# Patient Record
Sex: FEMALE | Race: WHITE | URBAN - METROPOLITAN AREA
[De-identification: names, ages, dates, MRNs, and addresses within clinical notes are randomized per-mention and may not be internally consistent; named-entity substitution may affect disease eponyms.]

---

## 2018-03-12 ENCOUNTER — INPATIENT (INPATIENT)
Facility: HOSPITAL | Age: 33
LOS: 1 days | Discharge: HOME | End: 2018-03-14
Attending: SURGERY

## 2018-03-12 VITALS
OXYGEN SATURATION: 99 % | SYSTOLIC BLOOD PRESSURE: 119 MMHG | HEART RATE: 68 BPM | TEMPERATURE: 96 F | RESPIRATION RATE: 18 BRPM | DIASTOLIC BLOOD PRESSURE: 85 MMHG

## 2018-03-12 DIAGNOSIS — Z98.891 HISTORY OF UTERINE SCAR FROM PREVIOUS SURGERY: Chronic | ICD-10-CM

## 2018-03-12 LAB
ALBUMIN SERPL ELPH-MCNC: 3.5 G/DL — SIGNIFICANT CHANGE UP (ref 3–5.5)
ALP SERPL-CCNC: 46 U/L — SIGNIFICANT CHANGE UP (ref 30–115)
ALT FLD-CCNC: 16 U/L — SIGNIFICANT CHANGE UP (ref 0–41)
ANION GAP SERPL CALC-SCNC: 4 MMOL/L — LOW (ref 7–14)
APPEARANCE UR: CLEAR — SIGNIFICANT CHANGE UP
AST SERPL-CCNC: 14 U/L — SIGNIFICANT CHANGE UP (ref 0–41)
BASOPHILS # BLD AUTO: 0.03 K/UL — SIGNIFICANT CHANGE UP (ref 0–0.2)
BASOPHILS NFR BLD AUTO: 0.3 % — SIGNIFICANT CHANGE UP (ref 0–1)
BILIRUB SERPL-MCNC: 0.3 MG/DL — SIGNIFICANT CHANGE UP (ref 0.2–1.2)
BILIRUB UR-MCNC: NEGATIVE — SIGNIFICANT CHANGE UP
BUN SERPL-MCNC: 10 MG/DL — SIGNIFICANT CHANGE UP (ref 10–20)
CALCIUM SERPL-MCNC: 9 MG/DL — SIGNIFICANT CHANGE UP (ref 8.5–10.1)
CHLORIDE SERPL-SCNC: 106 MMOL/L — SIGNIFICANT CHANGE UP (ref 98–110)
CO2 SERPL-SCNC: 26 MMOL/L — SIGNIFICANT CHANGE UP (ref 17–32)
COLOR SPEC: YELLOW — SIGNIFICANT CHANGE UP
CREAT SERPL-MCNC: 0.7 MG/DL — SIGNIFICANT CHANGE UP (ref 0.7–1.5)
DIFF PNL FLD: NEGATIVE — SIGNIFICANT CHANGE UP
EOSINOPHIL # BLD AUTO: 0.23 K/UL — SIGNIFICANT CHANGE UP (ref 0–0.7)
EOSINOPHIL NFR BLD AUTO: 2.1 % — SIGNIFICANT CHANGE UP (ref 0–8)
GLUCOSE SERPL-MCNC: 107 MG/DL — SIGNIFICANT CHANGE UP (ref 70–110)
GLUCOSE UR QL: NEGATIVE MG/DL — SIGNIFICANT CHANGE UP
HCT VFR BLD CALC: 36.6 % — LOW (ref 37–47)
HGB BLD-MCNC: 12.2 G/DL — SIGNIFICANT CHANGE UP (ref 12–16)
IMM GRANULOCYTES NFR BLD AUTO: 0.4 % — HIGH (ref 0.1–0.3)
KETONES UR-MCNC: NEGATIVE — SIGNIFICANT CHANGE UP
LACTATE SERPL-SCNC: 1.1 MMOL/L — SIGNIFICANT CHANGE UP (ref 0.5–2.2)
LEUKOCYTE ESTERASE UR-ACNC: NEGATIVE — SIGNIFICANT CHANGE UP
LIDOCAIN IGE QN: 29 U/L — SIGNIFICANT CHANGE UP (ref 7–60)
LYMPHOCYTES # BLD AUTO: 3.61 K/UL — HIGH (ref 1.2–3.4)
LYMPHOCYTES # BLD AUTO: 33.1 % — SIGNIFICANT CHANGE UP (ref 20.5–51.1)
MCHC RBC-ENTMCNC: 27.5 PG — SIGNIFICANT CHANGE UP (ref 27–31)
MCHC RBC-ENTMCNC: 33.3 G/DL — SIGNIFICANT CHANGE UP (ref 32–37)
MCV RBC AUTO: 82.4 FL — SIGNIFICANT CHANGE UP (ref 81–99)
MONOCYTES # BLD AUTO: 0.68 K/UL — HIGH (ref 0.1–0.6)
MONOCYTES NFR BLD AUTO: 6.2 % — SIGNIFICANT CHANGE UP (ref 1.7–9.3)
NEUTROPHILS # BLD AUTO: 6.31 K/UL — SIGNIFICANT CHANGE UP (ref 1.4–6.5)
NEUTROPHILS NFR BLD AUTO: 57.9 % — SIGNIFICANT CHANGE UP (ref 42.2–75.2)
NITRITE UR-MCNC: NEGATIVE — SIGNIFICANT CHANGE UP
PH UR: 8 — SIGNIFICANT CHANGE UP (ref 5–8)
PLATELET # BLD AUTO: 265 K/UL — SIGNIFICANT CHANGE UP (ref 130–400)
POTASSIUM SERPL-MCNC: 3.5 MMOL/L — SIGNIFICANT CHANGE UP (ref 3.5–5)
POTASSIUM SERPL-SCNC: 3.5 MMOL/L — SIGNIFICANT CHANGE UP (ref 3.5–5)
PROT SERPL-MCNC: 6.5 G/DL — SIGNIFICANT CHANGE UP (ref 6–8)
PROT UR-MCNC: (no result) MG/DL
RBC # BLD: 4.44 M/UL — SIGNIFICANT CHANGE UP (ref 4.2–5.4)
RBC # FLD: 12.5 % — SIGNIFICANT CHANGE UP (ref 11.5–14.5)
SODIUM SERPL-SCNC: 136 MMOL/L — SIGNIFICANT CHANGE UP (ref 135–146)
SP GR SPEC: 1.02 — SIGNIFICANT CHANGE UP (ref 1.01–1.03)
UROBILINOGEN FLD QL: 1 MG/DL (ref 0.2–0.2)
WBC # BLD: 10.9 K/UL — HIGH (ref 4.8–10.8)
WBC # FLD AUTO: 10.9 K/UL — HIGH (ref 4.8–10.8)

## 2018-03-12 RX ORDER — HEPARIN SODIUM 5000 [USP'U]/ML
5000 INJECTION INTRAVENOUS; SUBCUTANEOUS EVERY 8 HOURS
Qty: 0 | Refills: 0 | Status: DISCONTINUED | OUTPATIENT
Start: 2018-03-12 | End: 2018-03-13

## 2018-03-12 RX ORDER — IBUPROFEN 200 MG
400 TABLET ORAL EVERY 6 HOURS
Qty: 0 | Refills: 0 | Status: DISCONTINUED | OUTPATIENT
Start: 2018-03-12 | End: 2018-03-13

## 2018-03-12 RX ORDER — CIPROFLOXACIN LACTATE 400MG/40ML
400 VIAL (ML) INTRAVENOUS ONCE
Qty: 0 | Refills: 0 | Status: DISCONTINUED | OUTPATIENT
Start: 2018-03-12 | End: 2018-03-12

## 2018-03-12 RX ORDER — LANOLIN ALCOHOL/MO/W.PET/CERES
5 CREAM (GRAM) TOPICAL ONCE
Qty: 0 | Refills: 0 | Status: COMPLETED | OUTPATIENT
Start: 2018-03-12 | End: 2018-03-12

## 2018-03-12 RX ORDER — AMPICILLIN SODIUM AND SULBACTAM SODIUM 250; 125 MG/ML; MG/ML
3 INJECTION, POWDER, FOR SUSPENSION INTRAMUSCULAR; INTRAVENOUS ONCE
Qty: 0 | Refills: 0 | Status: COMPLETED | OUTPATIENT
Start: 2018-03-12 | End: 2018-03-12

## 2018-03-12 RX ORDER — METRONIDAZOLE 500 MG
500 TABLET ORAL ONCE
Qty: 0 | Refills: 0 | Status: DISCONTINUED | OUTPATIENT
Start: 2018-03-12 | End: 2018-03-12

## 2018-03-12 RX ORDER — MORPHINE SULFATE 50 MG/1
2 CAPSULE, EXTENDED RELEASE ORAL ONCE
Qty: 0 | Refills: 0 | Status: DISCONTINUED | OUTPATIENT
Start: 2018-03-12 | End: 2018-03-12

## 2018-03-12 RX ORDER — AMPICILLIN SODIUM AND SULBACTAM SODIUM 250; 125 MG/ML; MG/ML
INJECTION, POWDER, FOR SUSPENSION INTRAMUSCULAR; INTRAVENOUS
Qty: 0 | Refills: 0 | Status: DISCONTINUED | OUTPATIENT
Start: 2018-03-12 | End: 2018-03-13

## 2018-03-12 RX ORDER — ACETAMINOPHEN 500 MG
650 TABLET ORAL EVERY 6 HOURS
Qty: 0 | Refills: 0 | Status: DISCONTINUED | OUTPATIENT
Start: 2018-03-12 | End: 2018-03-13

## 2018-03-12 RX ORDER — PANTOPRAZOLE SODIUM 20 MG/1
40 TABLET, DELAYED RELEASE ORAL DAILY
Qty: 0 | Refills: 0 | Status: DISCONTINUED | OUTPATIENT
Start: 2018-03-12 | End: 2018-03-13

## 2018-03-12 RX ORDER — MORPHINE SULFATE 50 MG/1
6 CAPSULE, EXTENDED RELEASE ORAL ONCE
Qty: 0 | Refills: 0 | Status: DISCONTINUED | OUTPATIENT
Start: 2018-03-12 | End: 2018-03-12

## 2018-03-12 RX ORDER — SODIUM CHLORIDE 9 MG/ML
1000 INJECTION, SOLUTION INTRAVENOUS
Qty: 0 | Refills: 0 | Status: DISCONTINUED | OUTPATIENT
Start: 2018-03-13 | End: 2018-03-13

## 2018-03-12 RX ORDER — ONDANSETRON 8 MG/1
4 TABLET, FILM COATED ORAL EVERY 6 HOURS
Qty: 0 | Refills: 0 | Status: DISCONTINUED | OUTPATIENT
Start: 2018-03-12 | End: 2018-03-13

## 2018-03-12 RX ORDER — ONDANSETRON 8 MG/1
4 TABLET, FILM COATED ORAL ONCE
Qty: 0 | Refills: 0 | Status: COMPLETED | OUTPATIENT
Start: 2018-03-12 | End: 2018-03-12

## 2018-03-12 RX ORDER — AMPICILLIN SODIUM AND SULBACTAM SODIUM 250; 125 MG/ML; MG/ML
3 INJECTION, POWDER, FOR SUSPENSION INTRAMUSCULAR; INTRAVENOUS EVERY 6 HOURS
Qty: 0 | Refills: 0 | Status: DISCONTINUED | OUTPATIENT
Start: 2018-03-12 | End: 2018-03-13

## 2018-03-12 RX ORDER — SODIUM CHLORIDE 9 MG/ML
3 INJECTION INTRAMUSCULAR; INTRAVENOUS; SUBCUTANEOUS ONCE
Qty: 0 | Refills: 0 | Status: COMPLETED | OUTPATIENT
Start: 2018-03-12 | End: 2018-03-12

## 2018-03-12 RX ADMIN — HEPARIN SODIUM 5000 UNIT(S): 5000 INJECTION INTRAVENOUS; SUBCUTANEOUS at 23:10

## 2018-03-12 RX ADMIN — PANTOPRAZOLE SODIUM 40 MILLIGRAM(S): 20 TABLET, DELAYED RELEASE ORAL at 15:51

## 2018-03-12 RX ADMIN — MORPHINE SULFATE 6 MILLIGRAM(S): 50 CAPSULE, EXTENDED RELEASE ORAL at 07:00

## 2018-03-12 RX ADMIN — ONDANSETRON 4 MILLIGRAM(S): 8 TABLET, FILM COATED ORAL at 06:25

## 2018-03-12 RX ADMIN — SODIUM CHLORIDE 3 MILLILITER(S): 9 INJECTION INTRAMUSCULAR; INTRAVENOUS; SUBCUTANEOUS at 05:28

## 2018-03-12 RX ADMIN — AMPICILLIN SODIUM AND SULBACTAM SODIUM 200 GRAM(S): 250; 125 INJECTION, POWDER, FOR SUSPENSION INTRAMUSCULAR; INTRAVENOUS at 13:31

## 2018-03-12 RX ADMIN — AMPICILLIN SODIUM AND SULBACTAM SODIUM 200 GRAM(S): 250; 125 INJECTION, POWDER, FOR SUSPENSION INTRAMUSCULAR; INTRAVENOUS at 17:08

## 2018-03-12 RX ADMIN — MORPHINE SULFATE 6 MILLIGRAM(S): 50 CAPSULE, EXTENDED RELEASE ORAL at 06:25

## 2018-03-12 RX ADMIN — MORPHINE SULFATE 2 MILLIGRAM(S): 50 CAPSULE, EXTENDED RELEASE ORAL at 07:28

## 2018-03-12 NOTE — H&P ADULT - HISTORY OF PRESENT ILLNESS
31 YO F WITH NO PMH C/O RUQ PAIN INTERMITTENT OVER THE LAST YEAR SINCE SHE GAVE BIRTH, WORSENING OVER THE LAST DAY A/W NAUSEA NO VOMITING.

## 2018-03-12 NOTE — ED PROVIDER NOTE - ATTENDING CONTRIBUTION TO CARE
I have personally performed a history and physical exam on this patient and personally directed the management of the patient. Patient presents with right lower quad abdominal pain starting over the past several days with no vomiting. she denies any fevers or chills. On physical exam the patient is nc/at perrla eomi oropharynx clear cta b/l, rrr s1s2 noted abd ttp in the rlq with no guarding or rebound. we obtained labs, ct scan I will continue to monitor.

## 2018-03-12 NOTE — H&P ADULT - NSHPPHYSICALEXAM_GEN_ALL_CORE
ICU Vital Signs Last 24 Hrs  T(C): 36.5 (12 Mar 2018 06:04), Max: 36.5 (12 Mar 2018 06:04)  T(F): 97.7 (12 Mar 2018 06:04), Max: 97.7 (12 Mar 2018 06:04)  HR: 75 (12 Mar 2018 06:04) (68 - 75)  BP: 110/63 (12 Mar 2018 06:04) (110/63 - 119/85)  BP(mean): --  ABP: --  ABP(mean): --  RR: 18 (12 Mar 2018 06:04) (18 - 18)  SpO2: 99% (12 Mar 2018 04< from: US Abdomen Limited (03.12.18 @ 08:12) >      PHYSICAL EXAM:  GENERAL: NAD, well-appearing  CHEST/LUNG: Clear to auscultation bilaterally  HEART: Regular rate and rhythm  ABDOMEN: Soft, tender IN RUQ, NO GAURDING Nondistended; Bowel sounds present  EXTREMITIES:  No clubbing, cyanosis, or edema

## 2018-03-12 NOTE — ED PROVIDER NOTE - PROGRESS NOTE DETAILS
Pt seen s/o to m ~ 715am and seen shortly after s/o. Currently comfortable appearing. Aware of results to date. US result pending

## 2018-03-12 NOTE — ED ADULT NURSE NOTE - OBJECTIVE STATEMENT
Pt states that she is having abd pain, and nausea. denies vomiting and diarrhea, fever and chills, trouble urinating

## 2018-03-12 NOTE — H&P ADULT - NSHPLABSRESULTS_GEN_ALL_CORE
Labs:  CAPILLARY BLOOD GLUCOSE                              12.2   10.90 )-----------( 265      ( 12 Mar 2018 05:25 )             36.6       Auto Neutrophil %: 57.9 % (18 @ 05:25)  Auto Immature Granulocyte %: 0.4 % (18 @ 05:25)        136  |  106  |  10  -------------------    < end of copied text >    ---------<  107  3.5   |  26  |  0.7      Calcium, Total Serum: 9.0 mg/dL (18 @ 05:25)      LFTs:             6.5  | 0.3  | 14       ------------------[46      ( 12 Mar 2018 05:25 )  3.5  | x    | 16          Lipase:29     Amylase:x         Lactate, Blood: 1.1 mmol/L (18 @ 05:25)      Coags:        Urinalysis Basic - ( 12 Mar 2018 05:25 )    Color: Yellow / Appearance: Clear / S.025 / pH: x  Gluc: x / Ketone: Negative  / Bili: Negative / Urobili: 1.0 mg/dL   Blood: x / Protein: Trace mg/dL / Nitrite: Negative   Leuk Esterase: Negative / RBC: x / WBC x   Sq Epi: x / Non Sq Epi: Many /HPF / Bacteria: x    < from: US Abdomen Limited (18 @ 08:12) >     Cholelithiasis and sludge with a 1.7 cm impacted stone in the cystic   duct and associated gallbladder wall thickening. Sonographic Cunningham sign   is unreliable as patient received medication prior examination. Findings   are suspicious for cholecystitis in the appropriate clinical setting. A   nuclear medicine HIDA scan can be obtained for confirmation.    < end of copied text >    < from: CT Abdomen and Pelvis w/ IV Cont (18 @ 07:02) >    1. Cholelithiasis with dilated cystic duct and mild gallbladder wall   thickening worrisome for cholecystitis. Dedicated right upper quadrant   ultrasound may be obtained for further evaluation.  2. Right crenated/corpus luteal cyst.    < end of copied text >

## 2018-03-12 NOTE — H&P ADULT - ATTENDING COMMENTS
pt examined.   pt with recuurent biliary colics - acute /chronic cholecystitis,.  Pre op for Lap cholecystectomy

## 2018-03-12 NOTE — ED PROVIDER NOTE - OBJECTIVE STATEMENT
The patient is a 33 yo abdominal pain located in the right lower quadrant.  it is described as moderate in nature, she denies any dysuria, hestitancy, or frequency she denies any radiation, she denies any fevers or chills she denies any vomiting but notes mild nausea.

## 2018-03-13 ENCOUNTER — RESULT REVIEW (OUTPATIENT)
Age: 33
End: 2018-03-13

## 2018-03-13 LAB
ANION GAP SERPL CALC-SCNC: 9 MMOL/L — SIGNIFICANT CHANGE UP (ref 7–14)
APTT BLD: 34.8 SEC — SIGNIFICANT CHANGE UP (ref 27–39.2)
BASOPHILS # BLD AUTO: 0.02 K/UL — SIGNIFICANT CHANGE UP (ref 0–0.2)
BASOPHILS NFR BLD AUTO: 0.2 % — SIGNIFICANT CHANGE UP (ref 0–1)
BUN SERPL-MCNC: 6 MG/DL — LOW (ref 10–20)
CALCIUM SERPL-MCNC: 9.2 MG/DL — SIGNIFICANT CHANGE UP (ref 8.5–10.1)
CHLORIDE SERPL-SCNC: 109 MMOL/L — SIGNIFICANT CHANGE UP (ref 98–110)
CK MB CFR SERPL CALC: 2.9 NG/ML — SIGNIFICANT CHANGE UP (ref 0.6–6.3)
CO2 SERPL-SCNC: 20 MMOL/L — SIGNIFICANT CHANGE UP (ref 17–32)
CREAT SERPL-MCNC: 0.7 MG/DL — SIGNIFICANT CHANGE UP (ref 0.7–1.5)
EOSINOPHIL # BLD AUTO: 0 K/UL — SIGNIFICANT CHANGE UP (ref 0–0.7)
EOSINOPHIL NFR BLD AUTO: 0 % — SIGNIFICANT CHANGE UP (ref 0–8)
GLUCOSE SERPL-MCNC: 133 MG/DL — HIGH (ref 70–110)
HCG SERPL QL: NEGATIVE — SIGNIFICANT CHANGE UP
HCT VFR BLD CALC: 36.8 % — LOW (ref 37–47)
HGB BLD-MCNC: 12.3 G/DL — SIGNIFICANT CHANGE UP (ref 12–16)
IMM GRANULOCYTES NFR BLD AUTO: 0.4 % — HIGH (ref 0.1–0.3)
INR BLD: 1.18 RATIO — SIGNIFICANT CHANGE UP (ref 0.65–1.3)
LYMPHOCYTES # BLD AUTO: 1.31 K/UL — SIGNIFICANT CHANGE UP (ref 1.2–3.4)
LYMPHOCYTES # BLD AUTO: 10.4 % — LOW (ref 20.5–51.1)
MCHC RBC-ENTMCNC: 27.8 PG — SIGNIFICANT CHANGE UP (ref 27–31)
MCHC RBC-ENTMCNC: 33.4 G/DL — SIGNIFICANT CHANGE UP (ref 32–37)
MCV RBC AUTO: 83.1 FL — SIGNIFICANT CHANGE UP (ref 81–99)
MONOCYTES # BLD AUTO: 0.37 K/UL — SIGNIFICANT CHANGE UP (ref 0.1–0.6)
MONOCYTES NFR BLD AUTO: 2.9 % — SIGNIFICANT CHANGE UP (ref 1.7–9.3)
NEUTROPHILS # BLD AUTO: 10.89 K/UL — HIGH (ref 1.4–6.5)
NEUTROPHILS NFR BLD AUTO: 86.1 % — HIGH (ref 42.2–75.2)
PLATELET # BLD AUTO: 268 K/UL — SIGNIFICANT CHANGE UP (ref 130–400)
POTASSIUM SERPL-MCNC: 4 MMOL/L — SIGNIFICANT CHANGE UP (ref 3.5–5)
POTASSIUM SERPL-SCNC: 4 MMOL/L — SIGNIFICANT CHANGE UP (ref 3.5–5)
PROTHROM AB SERPL-ACNC: 12.8 SEC — SIGNIFICANT CHANGE UP (ref 9.95–12.87)
RBC # BLD: 4.43 M/UL — SIGNIFICANT CHANGE UP (ref 4.2–5.4)
RBC # FLD: 12.6 % — SIGNIFICANT CHANGE UP (ref 11.5–14.5)
SODIUM SERPL-SCNC: 138 MMOL/L — SIGNIFICANT CHANGE UP (ref 135–146)
TROPONIN I SERPL-MCNC: <0.02 NG/ML — SIGNIFICANT CHANGE UP (ref 0–0.05)
TYPE + AB SCN PNL BLD: SIGNIFICANT CHANGE UP
WBC # BLD: 12.64 K/UL — HIGH (ref 4.8–10.8)
WBC # FLD AUTO: 12.64 K/UL — HIGH (ref 4.8–10.8)

## 2018-03-13 RX ORDER — SODIUM CHLORIDE 9 MG/ML
1000 INJECTION INTRAMUSCULAR; INTRAVENOUS; SUBCUTANEOUS
Qty: 0 | Refills: 0 | Status: DISCONTINUED | OUTPATIENT
Start: 2018-03-13 | End: 2018-03-13

## 2018-03-13 RX ORDER — ACETAMINOPHEN 500 MG
650 TABLET ORAL EVERY 6 HOURS
Qty: 0 | Refills: 0 | Status: DISCONTINUED | OUTPATIENT
Start: 2018-03-13 | End: 2018-03-14

## 2018-03-13 RX ORDER — SODIUM CHLORIDE 9 MG/ML
1000 INJECTION, SOLUTION INTRAVENOUS
Qty: 0 | Refills: 0 | Status: DISCONTINUED | OUTPATIENT
Start: 2018-03-13 | End: 2018-03-13

## 2018-03-13 RX ORDER — OXYCODONE AND ACETAMINOPHEN 5; 325 MG/1; MG/1
1 TABLET ORAL EVERY 4 HOURS
Qty: 0 | Refills: 0 | Status: DISCONTINUED | OUTPATIENT
Start: 2018-03-13 | End: 2018-03-14

## 2018-03-13 RX ORDER — PROCHLORPERAZINE MALEATE 5 MG
10 TABLET ORAL ONCE
Qty: 0 | Refills: 0 | Status: COMPLETED | OUTPATIENT
Start: 2018-03-13 | End: 2018-03-13

## 2018-03-13 RX ORDER — MORPHINE SULFATE 50 MG/1
4 CAPSULE, EXTENDED RELEASE ORAL ONCE
Qty: 0 | Refills: 0 | Status: DISCONTINUED | OUTPATIENT
Start: 2018-03-13 | End: 2018-03-13

## 2018-03-13 RX ORDER — ONDANSETRON 8 MG/1
4 TABLET, FILM COATED ORAL ONCE
Qty: 0 | Refills: 0 | Status: DISCONTINUED | OUTPATIENT
Start: 2018-03-13 | End: 2018-03-14

## 2018-03-13 RX ORDER — HEPARIN SODIUM 5000 [USP'U]/ML
5000 INJECTION INTRAVENOUS; SUBCUTANEOUS EVERY 8 HOURS
Qty: 0 | Refills: 0 | Status: DISCONTINUED | OUTPATIENT
Start: 2018-03-13 | End: 2018-03-14

## 2018-03-13 RX ORDER — KETOROLAC TROMETHAMINE 30 MG/ML
15 SYRINGE (ML) INJECTION EVERY 6 HOURS
Qty: 0 | Refills: 0 | Status: DISCONTINUED | OUTPATIENT
Start: 2018-03-13 | End: 2018-03-14

## 2018-03-13 RX ADMIN — Medication 650 MILLIGRAM(S): at 17:50

## 2018-03-13 RX ADMIN — AMPICILLIN SODIUM AND SULBACTAM SODIUM 200 GRAM(S): 250; 125 INJECTION, POWDER, FOR SUSPENSION INTRAMUSCULAR; INTRAVENOUS at 00:24

## 2018-03-13 RX ADMIN — OXYCODONE AND ACETAMINOPHEN 1 TABLET(S): 5; 325 TABLET ORAL at 14:03

## 2018-03-13 RX ADMIN — Medication 10 MILLIGRAM(S): at 12:20

## 2018-03-13 RX ADMIN — HEPARIN SODIUM 5000 UNIT(S): 5000 INJECTION INTRAVENOUS; SUBCUTANEOUS at 05:44

## 2018-03-13 RX ADMIN — HEPARIN SODIUM 5000 UNIT(S): 5000 INJECTION INTRAVENOUS; SUBCUTANEOUS at 22:01

## 2018-03-13 RX ADMIN — MORPHINE SULFATE 4 MILLIGRAM(S): 50 CAPSULE, EXTENDED RELEASE ORAL at 13:00

## 2018-03-13 RX ADMIN — Medication 5 MILLIGRAM(S): at 00:25

## 2018-03-13 RX ADMIN — AMPICILLIN SODIUM AND SULBACTAM SODIUM 200 GRAM(S): 250; 125 INJECTION, POWDER, FOR SUSPENSION INTRAMUSCULAR; INTRAVENOUS at 05:44

## 2018-03-13 RX ADMIN — SODIUM CHLORIDE 125 MILLILITER(S): 9 INJECTION, SOLUTION INTRAVENOUS at 02:21

## 2018-03-13 RX ADMIN — SODIUM CHLORIDE 125 MILLILITER(S): 9 INJECTION, SOLUTION INTRAVENOUS at 00:25

## 2018-03-13 RX ADMIN — SODIUM CHLORIDE 80 MILLILITER(S): 9 INJECTION, SOLUTION INTRAVENOUS at 12:54

## 2018-03-13 NOTE — BRIEF OPERATIVE NOTE - PROCEDURE
<<-----Click on this checkbox to enter Procedure Hernia repair  03/13/2018  ventral  Active  CARLOS  Block, transverse abdominis plane (TAP)  03/13/2018    Active  CARLOS  Laparoscopic cholecystectomy  03/13/2018    Active  CARLOS

## 2018-03-13 NOTE — PROGRESS NOTE ADULT - SUBJECTIVE AND OBJECTIVE BOX
Progress Note: General Surgery  Patient: ENRIQUE SMITH , 32y (1985)Female   MRN: [id number]  Location: [address]  Visit: 18 Inpatient  Date: 18 @ 03:56  Hospital Day: 2    Procedure/Diagnosis: 31 YO F WITH NO PMH C/O RUQ PAIN INTERMITTENT OVER THE LAST YEAR SINCE SHE GAVE BIRTH, WORSENING OVER THE LAST DAY A/W NAUSEA NO VOMITING.   Events over 24h: admitted to surgical service, NPO, pre-oped for OR today, laparoscopic cholecystectomy,    Vitals: T(F): 97.4 (18 @ 20:00), Max: 97.7 (18 @ 06:04)  HR: 83 (18 @ 20:00)  BP: 101/965 (18 @ 20:00) (101/965 - 119/85)  RR: 18 (18 @ 20:00)  SpO2: 100% (18 @ 20:00)    In:   18 @ 07:  -  18 @ 03:56  --------------------------------------------------------  IN: 125 mL    Out:   18 @ 07:01  -  18 @ 03:56  --------------------------------------------------------  OUT:  Total OUT: 0 mL    Net:   18 @ 07:01  -  18 @ 03:56  --------------------------------------------------------  NET: 125 mL    Diet: Diet, NPO (18 @ 14:10)  IV Fluids: yes , Type: lactated ringers. 1000 milliLiter(s) (125 mL/Hr) IV Continuous <Continuous>    Physical Examination:  ***    Medications:  acetaminophen   Tablet. 650 milliGRAM(s) Oral every 6 hours PRN Mild Pain (1 - 3)  ibuprofen  Tablet 400 milliGRAM(s) Oral every 6 hours PRN Mild Pain  ondansetron Injectable 4 milliGRAM(s) IV Push every 6 hours PRN Nausea    DVT Prophylaxis: heparin  Injectable 5000 Unit(s) SubCutaneous every 8 hours  GI Prophylaxis: pantoprazole   Suspension 40 milliGRAM(s) Oral daily  Antibiotics: ampicillin/sulbactam  IVPB 3 Gram(s) IV Intermittent every 6 hours  ampicillin/sulbactam  IVPB        Labs:                        12.2   10.90 )-----------( 265      ( 12 Mar 2018 05:25 )             36.6     03-12    136  |  106  |  10  ----------------------------<  107  3.5   |  26  |  0.7    Ca    9.0      12 Mar 2018 05:25    TPro  6.5  /  Alb  3.5  /  TBili  0.3  /  DBili  x   /  AST  14  /  ALT  16  /  AlkPhos  46  03-12    LIVER FUNCTIONS - ( 12 Mar 2018 05:25 )  Alb: 3.5 g/dL / Pro: 6.5 g/dL / ALK PHOS: 46 U/L / ALT: 16 U/L / AST: 14 U/L / GGT: x           Urinalysis Basic - ( 12 Mar 2018 05:25 )    Color: Yellow / Appearance: Clear / S.025 / pH: x  Gluc: x / Ketone: Negative  / Bili: Negative / Urobili: 1.0 mg/dL   Blood: x / Protein: Trace mg/dL / Nitrite: Negative   Leuk Esterase: Negative / RBC: x / WBC x   Sq Epi: x / Non Sq Epi: Many /HPF / Bacteria: x    Lipase:29   Lactate, Blood: 1.1 mmol/L (18 @ 05:25)    Imaging:  < from: US Abdomen Limited (18 @ 08:12) >    	 Cholelithiasis and sludge with a 1.7 cm impacted stone in the cystic   	duct and associated gallbladder wall thickening. Sonographic Cunningham sign   	is unreliable as patient received medication prior examination. Findings   	are suspicious for cholecystitis in the appropriate clinical setting. A   	nuclear medicine HIDA scan can be obtained for confirmation.    	< end of copied text >    	< from: CT Abdomen and Pelvis w/ IV Cont (18 @ 07:02) >    	1. Cholelithiasis with dilated cystic duct and mild gallbladder wall   	thickening worrisome for cholecystitis. Dedicated right upper quadrant   	ultrasound may be obtained for further evaluation.  	2. Right crenated/corpus luteal cyst.    	< end of copied text >    Assessment:  32y Female patient admitted for acute cholecystitis w/ impacted stone in GB, with the above physical exam, labs, and imaging findings.    Plan:  Pre-oped  consented  for OR today: lap joanie  serum preg (-)  T+S done  currently NPO + IVF    Date/Time: 18 @ 03:56 Progress Note: General Surgery  Patient: ENRIQUE SMITH , 32y (1985)Female   MRN: [id number]  Location: [address]  Visit: 18 Inpatient  Date: 18 @ 03:56  Hospital Day: 2    Procedure/Diagnosis: 33 YO F WITH NO PMH C/O RUQ PAIN INTERMITTENT OVER THE LAST YEAR SINCE SHE GAVE BIRTH, WORSENING OVER THE LAST DAY A/W NAUSEA NO VOMITING.   Events over 24h: admitted to surgical service, NPO, pre-oped for OR today, laparoscopic cholecystectomy, pain decreased, no nausea/vomiting    Vitals: T(F): 97.4 (18 @ 20:00), Max: 97.7 (18 @ 06:04)  HR: 83 (18 @ 20:00)  BP: 101/965 (18 @ 20:00) (101/965 - 119/85)  RR: 18 (18 @ 20:00)  SpO2: 100% (18 @ 20:00)    In:   18 @ 07:  -  18 @ 03:56  --------------------------------------------------------  IN: 125 mL    Out:   18 @ 07:  -  18 @ 03:56  --------------------------------------------------------  OUT:  Total OUT: 0 mL    Net:   18 @ 07:01  -  18 @ 03:56  --------------------------------------------------------  NET: 125 mL    Diet: Diet, NPO (03-12-18 @ 14:10)  IV Fluids: yes , Type: lactated ringers. 1000 milliLiter(s) (125 mL/Hr) IV Continuous <Continuous>    Physical Examination:  GENERAL: NAD, well-appearing  CHEST/LUNG: Clear to auscultation bilaterally, no W/R/C  HEART: Regular rate and rhythm, no M/R/G  ABDOMEN: Soft, non-tender, Nondistended; Bowel sounds present, no Cunningham sign, no guarding or rigidity    Medications:  acetaminophen   Tablet. 650 milliGRAM(s) Oral every 6 hours PRN Mild Pain (1 - 3)  ibuprofen  Tablet 400 milliGRAM(s) Oral every 6 hours PRN Mild Pain  ondansetron Injectable 4 milliGRAM(s) IV Push every 6 hours PRN Nausea    DVT Prophylaxis: heparin  Injectable 5000 Unit(s) SubCutaneous every 8 hours  GI Prophylaxis: pantoprazole   Suspension 40 milliGRAM(s) Oral daily  Antibiotics: ampicillin/sulbactam  IVPB 3 Gram(s) IV Intermittent every 6 hours  ampicillin/sulbactam  IVPB        Labs:                        12.2   10.90 )-----------( 265      ( 12 Mar 2018 05:25 )             36.6     03-12    136  |  106  |  10  ----------------------------<  107  3.5   |  26  |  0.7    Ca    9.0      12 Mar 2018 05:25    TPro  6.5  /  Alb  3.5  /  TBili  0.3  /  DBili  x   /  AST  14  /  ALT  16  /  AlkPhos  46  12    LIVER FUNCTIONS - ( 12 Mar 2018 05:25 )  Alb: 3.5 g/dL / Pro: 6.5 g/dL / ALK PHOS: 46 U/L / ALT: 16 U/L / AST: 14 U/L / GGT: x           Urinalysis Basic - ( 12 Mar 2018 05:25 )    Color: Yellow / Appearance: Clear / S.025 / pH: x  Gluc: x / Ketone: Negative  / Bili: Negative / Urobili: 1.0 mg/dL   Blood: x / Protein: Trace mg/dL / Nitrite: Negative   Leuk Esterase: Negative / RBC: x / WBC x   Sq Epi: x / Non Sq Epi: Many /HPF / Bacteria: x    Lipase:29   Lactate, Blood: 1.1 mmol/L (18 @ 05:25)    Imaging:  < from: US Abdomen Limited (18 @ 08:12) >    	 Cholelithiasis and sludge with a 1.7 cm impacted stone in the cystic   	duct and associated gallbladder wall thickening. Sonographic Cunningham sign   	is unreliable as patient received medication prior examination. Findings   	are suspicious for cholecystitis in the appropriate clinical setting. A   	nuclear medicine HIDA scan can be obtained for confirmation.    	< end of copied text >    	< from: CT Abdomen and Pelvis w/ IV Cont (18 @ 07:02) >    	1. Cholelithiasis with dilated cystic duct and mild gallbladder wall   	thickening worrisome for cholecystitis. Dedicated right upper quadrant   	ultrasound may be obtained for further evaluation.  	2. Right crenated/corpus luteal cyst.    	< end of copied text >

## 2018-03-14 ENCOUNTER — TRANSCRIPTION ENCOUNTER (OUTPATIENT)
Age: 33
End: 2018-03-14

## 2018-03-14 VITALS
SYSTOLIC BLOOD PRESSURE: 114 MMHG | RESPIRATION RATE: 17 BRPM | HEART RATE: 83 BPM | DIASTOLIC BLOOD PRESSURE: 65 MMHG | TEMPERATURE: 97 F

## 2018-03-14 LAB
ALBUMIN SERPL ELPH-MCNC: 3.4 G/DL — SIGNIFICANT CHANGE UP (ref 3–5.5)
ALP SERPL-CCNC: 80 U/L — SIGNIFICANT CHANGE UP (ref 30–115)
ALT FLD-CCNC: 371 U/L — HIGH (ref 0–41)
ANION GAP SERPL CALC-SCNC: 5 MMOL/L — LOW (ref 7–14)
AST SERPL-CCNC: 216 U/L — HIGH (ref 0–41)
BASOPHILS # BLD AUTO: 0.03 K/UL — SIGNIFICANT CHANGE UP (ref 0–0.2)
BASOPHILS NFR BLD AUTO: 0.3 % — SIGNIFICANT CHANGE UP (ref 0–1)
BILIRUB SERPL-MCNC: 0.7 MG/DL — SIGNIFICANT CHANGE UP (ref 0.2–1.2)
BUN SERPL-MCNC: 5 MG/DL — LOW (ref 10–20)
CALCIUM SERPL-MCNC: 9 MG/DL — SIGNIFICANT CHANGE UP (ref 8.5–10.1)
CHLORIDE SERPL-SCNC: 106 MMOL/L — SIGNIFICANT CHANGE UP (ref 98–110)
CK MB BLD-MCNC: 1 % — SIGNIFICANT CHANGE UP (ref 0–4)
CK MB CFR SERPL CALC: 1.1 NG/ML — SIGNIFICANT CHANGE UP (ref 0.6–6.3)
CK SERPL-CCNC: 97 U/L — SIGNIFICANT CHANGE UP (ref 0–225)
CO2 SERPL-SCNC: 25 MMOL/L — SIGNIFICANT CHANGE UP (ref 17–32)
CREAT SERPL-MCNC: 0.6 MG/DL — LOW (ref 0.7–1.5)
EOSINOPHIL # BLD AUTO: 0.09 K/UL — SIGNIFICANT CHANGE UP (ref 0–0.7)
EOSINOPHIL NFR BLD AUTO: 0.9 % — SIGNIFICANT CHANGE UP (ref 0–8)
GLUCOSE SERPL-MCNC: 100 MG/DL — SIGNIFICANT CHANGE UP (ref 70–110)
HCT VFR BLD CALC: 35.5 % — LOW (ref 37–47)
HGB BLD-MCNC: 11.8 G/DL — LOW (ref 12–16)
IMM GRANULOCYTES NFR BLD AUTO: 0.4 % — HIGH (ref 0.1–0.3)
LYMPHOCYTES # BLD AUTO: 2.29 K/UL — SIGNIFICANT CHANGE UP (ref 1.2–3.4)
LYMPHOCYTES # BLD AUTO: 24.2 % — SIGNIFICANT CHANGE UP (ref 20.5–51.1)
MAGNESIUM SERPL-MCNC: 1.7 MG/DL — LOW (ref 1.8–2.4)
MCHC RBC-ENTMCNC: 27.7 PG — SIGNIFICANT CHANGE UP (ref 27–31)
MCHC RBC-ENTMCNC: 33.2 G/DL — SIGNIFICANT CHANGE UP (ref 32–37)
MCV RBC AUTO: 83.3 FL — SIGNIFICANT CHANGE UP (ref 81–99)
MONOCYTES # BLD AUTO: 0.49 K/UL — SIGNIFICANT CHANGE UP (ref 0.1–0.6)
MONOCYTES NFR BLD AUTO: 5.2 % — SIGNIFICANT CHANGE UP (ref 1.7–9.3)
NEUTROPHILS # BLD AUTO: 6.54 K/UL — HIGH (ref 1.4–6.5)
NEUTROPHILS NFR BLD AUTO: 69 % — SIGNIFICANT CHANGE UP (ref 42.2–75.2)
PHOSPHATE SERPL-MCNC: 3.4 MG/DL — SIGNIFICANT CHANGE UP (ref 2.1–4.9)
PLATELET # BLD AUTO: 258 K/UL — SIGNIFICANT CHANGE UP (ref 130–400)
POTASSIUM SERPL-MCNC: 3.7 MMOL/L — SIGNIFICANT CHANGE UP (ref 3.5–5)
POTASSIUM SERPL-SCNC: 3.7 MMOL/L — SIGNIFICANT CHANGE UP (ref 3.5–5)
PROT SERPL-MCNC: 6.4 G/DL — SIGNIFICANT CHANGE UP (ref 6–8)
RBC # BLD: 4.26 M/UL — SIGNIFICANT CHANGE UP (ref 4.2–5.4)
RBC # FLD: 12.7 % — SIGNIFICANT CHANGE UP (ref 11.5–14.5)
SODIUM SERPL-SCNC: 136 MMOL/L — SIGNIFICANT CHANGE UP (ref 135–146)
TROPONIN I SERPL-MCNC: <0.02 NG/ML — SIGNIFICANT CHANGE UP (ref 0–0.05)
WBC # BLD: 9.48 K/UL — SIGNIFICANT CHANGE UP (ref 4.8–10.8)
WBC # FLD AUTO: 9.48 K/UL — SIGNIFICANT CHANGE UP (ref 4.8–10.8)

## 2018-03-14 RX ORDER — ACETAMINOPHEN 500 MG
2 TABLET ORAL
Qty: 0 | Refills: 0 | COMMUNITY
Start: 2018-03-14

## 2018-03-14 RX ADMIN — Medication 15 MILLIGRAM(S): at 00:26

## 2018-03-14 RX ADMIN — HEPARIN SODIUM 5000 UNIT(S): 5000 INJECTION INTRAVENOUS; SUBCUTANEOUS at 06:07

## 2018-03-14 RX ADMIN — Medication 15 MILLIGRAM(S): at 04:07

## 2018-03-14 RX ADMIN — Medication 15 MILLIGRAM(S): at 11:51

## 2018-03-14 RX ADMIN — Medication 15 MILLIGRAM(S): at 12:06

## 2018-03-14 NOTE — DISCHARGE NOTE ADULT - HOSPITAL COURSE
33 y/o female patient with no sig PMH, p/w RUQ abdominal pain over past year progressively worsening, associated nausea and vomiting. Had US RUQ demonstrating large stone, 1.7cm impacted in neck of GB with GB wall thickening, indicative of acute cholecystitis. She was admitted to surgery, placed on IV abx, and taken to the OR for a laparoscopic cholecystectomy, single incision. Patient tolerated the procedure well w/o complications,. She was able to tolerate diet, ambulate, had pain controlled with medications, and is cleared surgically for discharge home She will follow up in the clinic in 1-2 weeks. Prescribed percocet and will take tylenol and motrin for pain.

## 2018-03-14 NOTE — DISCHARGE NOTE ADULT - MEDICATION SUMMARY - MEDICATIONS TO TAKE
I will START or STAY ON the medications listed below when I get home from the hospital:    acetaminophen 325 mg oral tablet  -- 2 tab(s) by mouth every 6 hours, As needed, Mild Pain (1 - 3)  -- Indication: For Calculus of gallbladder with biliary obstruction but without cholecystitis

## 2018-03-14 NOTE — PROGRESS NOTE ADULT - SUBJECTIVE AND OBJECTIVE BOX
ENRIQUE SMITH  32y Female   [id number]    Hospital Day: 3  Post Operative Day: 1  Procedure: S/P LAP EULALIO AND PRIMARY HERNIA REPAIR  Patient is a 32y old  Female who presents with a chief complaint of RUQ PAIN (12 Mar 2018 13:53)    PAST MEDICAL & SURGICAL HISTORY:  No pertinent past medical history  H/O  section      Events of the Last 24h: EPISODE OF CHEST PAIN - WORKED UP, NEGATIVE - LIKELY 2/2 ANXIETY  Vital Signs Last 24 Hrs  T(C): 36.9 (14 Mar 2018 00:00), Max: 37.3 (13 Mar 2018 12:15)  T(F): 98.4 (14 Mar 2018 00:00), Max: 99.1 (13 Mar 2018 12:15)  HR: 87 (14 Mar 2018 00:00) (72 - 94)  BP: 101/66 (14 Mar 2018 00:00) (87/52 - 121/71)  BP(mean): --  RR: 18 (14 Mar 2018 00:00) (10 - 24)  SpO2: 98% (13 Mar 2018 13:30) (97% - 99%)        Diet, Low Fiber (18 @ 12:09)      I&O's Summary    12 Mar 2018 07:  -  13 Mar 2018 07:00  --------------------------------------------------------  IN: 725 mL / OUT: 0 mL / NET: 725 mL    13 Mar 2018 07:  -  14 Mar 2018 04:31  --------------------------------------------------------  IN: 560 mL / OUT: 875 mL / NET: -315 mL     I&O's Detail    12 Mar 2018 07:  -  13 Mar 2018 07:00  --------------------------------------------------------  IN:    IV PiggyBack: 225 mL    lactated ringers.: 500 mL  Total IN: 725 mL    OUT:  Total OUT: 0 mL    Total NET: 725 mL      13 Mar 2018 07:01  -  14 Mar 2018 04:31  --------------------------------------------------------  IN:    lactated ringers.: 80 mL    Oral Fluid: 480 mL  Total IN: 560 mL    OUT:    Voided: 875 mL  Total OUT: 875 mL    Total NET: -315 mL          MEDICATIONS  (STANDING):  heparin  Injectable 5000 Unit(s) SubCutaneous every 8 hours    MEDICATIONS  (PRN):  acetaminophen   Tablet. 650 milliGRAM(s) Oral every 6 hours PRN Mild Pain (1 - 3)  ketorolac   Injectable 15 milliGRAM(s) IV Push every 6 hours PRN Moderate Pain (4 - 6)  ondansetron Injectable 4 milliGRAM(s) IV Push once PRN Nausea and/or Vomiting  oxyCODONE    5 mG/acetaminophen 325 mG 1 Tablet(s) Oral every 4 hours PRN Moderate Pain (4 - 6)  oxyCODONE    5 mG/acetaminophen 325 mG 1 Tablet(s) Oral every 4 hours PRN Moderate Pain (4 - 6)      PHYSICAL EXAM:    GENERAL: NAD    HEENT: NCAT    CHEST/LUNGS: CTAB    HEART: RRR,  No murmurs, rubs, or gallops    ABDOMEN: SOFT, NON DISTENDED, MILD RUQ TENDERNESS    EXTREMITIES:  FROM, No clubbing, cyanosis, or edema, palpable pulse    NEURO: No focal neurological deficits    SKIN: No rashes or lesions    INCISION/WOUNDS: C/D/I                          12.3   12.64 )-----------( 268      ( 13 Mar 2018 16:34 )             36.8        CBC Full  -  ( 13 Mar 2018 16:34 )  WBC Count : 12.64 K/uL  Hemoglobin : 12.3 g/dL  Hematocrit : 36.8 %  Platelet Count - Automated : 268 K/uL  Mean Cell Volume : 83.1 fL  Mean Cell Hemoglobin : 27.8 pg  Mean Cell Hemoglobin Concentration : 33.4 g/dL  Auto Neutrophil # : 10.89 K/uL  Auto Lymphocyte # : 1.31 K/uL  Auto Monocyte # : 0.37 K/uL  Auto Eosinophil # : 0.00 K/uL  Auto Basophil # : 0.02 K/uL  Auto Neutrophil % : 86.1 %  Auto Lymphocyte % : 10.4 %  Auto Monocyte % : 2.9 %  Auto Eosinophil % : 0.0 %  Auto Basophil % : 0.2 %               138   |  109   |  6                  Ca: 9.2    BMP:   ----------------------------< 133    Mg: x     (18 @ 16:34)             4.0    |  20    | 0.7                Ph: x        LFT:     TPro: 6.5 / Alb: 3.5 / TBili: 0.3 / DBili: x / AST: 14 / ALT: 16 / AlkPhos: 46   (18 @ 05:25)    LIVER FUNCTIONS - ( 12 Mar 2018 05:25 )  Alb: 3.5 g/dL / Pro: 6.5 g/dL / ALK PHOS: 46 U/L / ALT: 16 U/L / AST: 14 U/L / GGT: x           PT/INR - ( 13 Mar 2018 05:53 )   PT: 12.80 sec;   INR: 1.18 ratio         PTT - ( 13 Mar 2018 05:53 )  PTT:34.8 sec  CARDIAC MARKERS ( 13 Mar 2018 16:34 )  x     / x     / 123 U/L / x     / x      CARDIAC MARKERS ( 13 Mar 2018 16:34 )  <0.02 ng/mL / x     / x     / x     / 2.9 ng/mL      Urinalysis Basic - ( 12 Mar 2018 05:25 )    Color: Yellow / Appearance: Clear / S.025 / pH: x  Gluc: x / Ketone: Negative  / Bili: Negative / Urobili: 1.0 mg/dL   Blood: x / Protein: Trace mg/dL / Nitrite: Negative   Leuk Esterase: Negative / RBC: x / WBC x   Sq Epi: x / Non Sq Epi: Many /HPF / Bacteria: x        SPECTRA: [id number] ENRIQUE SMITH  32y Female   [id number]    Hospital Day: 3  Post Operative Day: 1  Procedure: S/P LAP EULALIO AND PRIMARY HERNIA REPAIR  Patient is a 32y old  Female who presents with a chief complaint of RUQ PAIN (12 Mar 2018 13:53)    PAST MEDICAL & SURGICAL HISTORY:  No pertinent past medical history  H/O  section      Events of the Last 24h: EPISODE OF CHEST PAIN - WORKED UP, NEGATIVE - LIKELY 2/2 ANXIETY  Vital Signs Last 24 Hrs  T(C): 36.9 (14 Mar 2018 00:00), Max: 37.3 (13 Mar 2018 12:15)  T(F): 98.4 (14 Mar 2018 00:00), Max: 99.1 (13 Mar 2018 12:15)  HR: 87 (14 Mar 2018 00:00) (72 - 94)  BP: 101/66 (14 Mar 2018 00:00) (87/52 - 121/71)  BP(mean): --  RR: 18 (14 Mar 2018 00:00) (10 - 24)  SpO2: 98% (13 Mar 2018 13:30) (97% - 99%)        Diet, Low Fiber (18 @ 12:09)      I&O's Summary    12 Mar 2018 07:  -  13 Mar 2018 07:00  --------------------------------------------------------  IN: 725 mL / OUT: 0 mL / NET: 725 mL    13 Mar 2018 07:  -  14 Mar 2018 04:31  --------------------------------------------------------  IN: 560 mL / OUT: 875 mL / NET: -315 mL     I&O's Detail    12 Mar 2018 07:  -  13 Mar 2018 07:00  --------------------------------------------------------  IN:    IV PiggyBack: 225 mL    lactated ringers.: 500 mL  Total IN: 725 mL    OUT:  Total OUT: 0 mL    Total NET: 725 mL      13 Mar 2018 07:01  -  14 Mar 2018 04:31  --------------------------------------------------------  IN:    lactated ringers.: 80 mL    Oral Fluid: 480 mL  Total IN: 560 mL    OUT:    Voided: 875 mL  Total OUT: 875 mL    Total NET: -315 mL          MEDICATIONS  (STANDING):  heparin  Injectable 5000 Unit(s) SubCutaneous every 8 hours    MEDICATIONS  (PRN):  acetaminophen   Tablet. 650 milliGRAM(s) Oral every 6 hours PRN Mild Pain (1 - 3)  ketorolac   Injectable 15 milliGRAM(s) IV Push every 6 hours PRN Moderate Pain (4 - 6)  ondansetron Injectable 4 milliGRAM(s) IV Push once PRN Nausea and/or Vomiting  oxyCODONE    5 mG/acetaminophen 325 mG 1 Tablet(s) Oral every 4 hours PRN Moderate Pain (4 - 6)  oxyCODONE    5 mG/acetaminophen 325 mG 1 Tablet(s) Oral every 4 hours PRN Moderate Pain (4 - 6)      PHYSICAL EXAM:    GENERAL: NAD    HEENT: NCAT    CHEST/LUNGS: CTAB    HEART: RRR,  No murmurs, rubs, or gallops    ABDOMEN: SOFT, NON DISTENDED, MILD UMBILICAL TENDERNESS    EXTREMITIES:  FROM, No clubbing, cyanosis, or edema, palpable pulse    NEURO: No focal neurological deficits    SKIN: No rashes or lesions    INCISION/WOUNDS: C/D/I                          12.3   12.64 )-----------( 268      ( 13 Mar 2018 16:34 )             36.8        CBC Full  -  ( 13 Mar 2018 16:34 )  WBC Count : 12.64 K/uL  Hemoglobin : 12.3 g/dL  Hematocrit : 36.8 %  Platelet Count - Automated : 268 K/uL  Mean Cell Volume : 83.1 fL  Mean Cell Hemoglobin : 27.8 pg  Mean Cell Hemoglobin Concentration : 33.4 g/dL  Auto Neutrophil # : 10.89 K/uL  Auto Lymphocyte # : 1.31 K/uL  Auto Monocyte # : 0.37 K/uL  Auto Eosinophil # : 0.00 K/uL  Auto Basophil # : 0.02 K/uL  Auto Neutrophil % : 86.1 %  Auto Lymphocyte % : 10.4 %  Auto Monocyte % : 2.9 %  Auto Eosinophil % : 0.0 %  Auto Basophil % : 0.2 %               138   |  109   |  6                  Ca: 9.2    BMP:   ----------------------------< 133    Mg: x     (18 @ 16:34)             4.0    |  20    | 0.7                Ph: x        LFT:     TPro: 6.5 / Alb: 3.5 / TBili: 0.3 / DBili: x / AST: 14 / ALT: 16 / AlkPhos: 46   (18 @ 05:25)    LIVER FUNCTIONS - ( 12 Mar 2018 05:25 )  Alb: 3.5 g/dL / Pro: 6.5 g/dL / ALK PHOS: 46 U/L / ALT: 16 U/L / AST: 14 U/L / GGT: x           PT/INR - ( 13 Mar 2018 05:53 )   PT: 12.80 sec;   INR: 1.18 ratio         PTT - ( 13 Mar 2018 05:53 )  PTT:34.8 sec  CARDIAC MARKERS ( 13 Mar 2018 16:34 )  x     / x     / 123 U/L / x     / x      CARDIAC MARKERS ( 13 Mar 2018 16:34 )  <0.02 ng/mL / x     / x     / x     / 2.9 ng/mL      Urinalysis Basic - ( 12 Mar 2018 05:25 )    Color: Yellow / Appearance: Clear / S.025 / pH: x  Gluc: x / Ketone: Negative  / Bili: Negative / Urobili: 1.0 mg/dL   Blood: x / Protein: Trace mg/dL / Nitrite: Negative   Leuk Esterase: Negative / RBC: x / WBC x   Sq Epi: x / Non Sq Epi: Many /HPF / Bacteria: x        SPECTRA: [id number]

## 2018-03-14 NOTE — DISCHARGE NOTE ADULT - NSTOBACCOHOTLINE_GEN_A_NCS
[facility] Smokers Quitline ([id number]) [facility] Smokers Quitline ([phone])

## 2018-03-14 NOTE — DISCHARGE NOTE ADULT - CARE PROVIDER_API CALL
Quyen Garcia (JAVIER), Surgery  [address] [place] [address]  Phone: [phone]  Fax: [phone]

## 2018-03-14 NOTE — DISCHARGE NOTE ADULT - NS AS ACTIVITY OBS
Walking-Outdoors allowed/Walking-Indoors allowed/No Heavy lifting/straining/Return to Work/School allowed/Showering allowed/Stairs allowed/Do not drive or operate machinery/work: 1-2 weeks, light duty, school: 2-3 days as tolerated

## 2018-03-14 NOTE — DISCHARGE NOTE ADULT - INSTRUCTIONS
Low fiber diet, smaller meals for first week  No heavy lifting >20lbs  No driving while taking narcotics

## 2018-03-14 NOTE — PROGRESS NOTE ADULT - ASSESSMENT
33 Y/O FEMALE S/P LAP EULALIO AND PRIMARY HERNIA REPAIR, HAD EPISODE OF SEVERE CHEST PAIN AND SOB AFTER RECEIVING FIRST DOSE OF PERCOCET. TROPONINS NEGATIVE, CXR NEGATIVE, EKG NEGATIVE. TOLERATING SMALL AMOUNT OF DIET, NO NAUSEA, NO VOMITING. -BM, -FLATUS.    PLAN: D/C HOME ONCE TOLERATES DIET ON MOTRIN/TYLENOL FOR PAIN CONTROL. F/U IN CLINIC IN 2 WEEKS.
Assessment:  32y Female patient admitted for acute cholecystitis w/ impacted stone in GB, with the above physical exam, labs, and imaging findings.    Plan:  Pre-oped  consented  for OR today: lap joanie  serum preg (-)  T+S done  currently NPO + IVF    Date/Time: 03-13-18 @ 03:56

## 2018-03-14 NOTE — PROGRESS NOTE ADULT - ATTENDING COMMENTS
PT S/P SINGLE SITE CHOLECYSTECTOMY.   DOING BETTER.   Tolerating diet .   ambulating.   pain undercontrol.  DC planning

## 2018-03-14 NOTE — DISCHARGE NOTE ADULT - PATIENT PORTAL LINK FT
You can access the [facility] Patient Portal, offered by [facility], by registering with the following website: [email or web address]

## 2018-03-14 NOTE — DISCHARGE NOTE ADULT - CARE PLAN
Principal Discharge DX:	Calculus of gallbladder with biliary obstruction but without cholecystitis  Goal:	Complete recovery, follow up in the surgery clinic  Assessment and plan of treatment:	Please call the office at the number provided below and follow up in the clinic in 1-2 weeks for a post op visit  Pain control: tylenol 650mg PO [id number] as needed  motrin 600mg PO [id number] as needed  Percocet 5/325mg PO [id number] for severe pain only as needed  you may remove dressing and shower tomorrow night, leave steristrips in place until they peel off

## 2018-03-14 NOTE — DISCHARGE NOTE ADULT - PLAN OF CARE
Please call the office at the number provided below and follow up in the clinic in 1-2 weeks for a post op visit  Pain control: tylenol 650mg PO [id number] as needed  motrin 600mg PO [id number] as needed  Percocet 5/325mg PO [id number] for severe pain only as needed  you may remove dressing and shower tomorrow night, leave steristrips in place until they peel off Complete recovery, follow up in the surgery clinic

## 2018-03-15 DIAGNOSIS — K42.9 UMBILICAL HERNIA WITHOUT OBSTRUCTION OR GANGRENE: ICD-10-CM

## 2018-03-15 DIAGNOSIS — K80.21 CALCULUS OF GALLBLADDER WITHOUT CHOLECYSTITIS WITH OBSTRUCTION: ICD-10-CM

## 2018-03-15 DIAGNOSIS — G89.18 OTHER ACUTE POSTPROCEDURAL PAIN: ICD-10-CM

## 2018-03-16 LAB — SURGICAL PATHOLOGY STUDY: SIGNIFICANT CHANGE UP

## 2018-03-18 DIAGNOSIS — K80.01 CALCULUS OF GALLBLADDER WITH ACUTE CHOLECYSTITIS WITH OBSTRUCTION: ICD-10-CM

## 2018-03-19 PROBLEM — Z00.00 ENCOUNTER FOR PREVENTIVE HEALTH EXAMINATION: Status: ACTIVE | Noted: 2018-03-19

## 2018-03-21 ENCOUNTER — APPOINTMENT (OUTPATIENT)
Dept: SURGERY | Facility: CLINIC | Age: 33
End: 2018-03-21

## 2018-03-21 ENCOUNTER — APPOINTMENT (OUTPATIENT)
Dept: SURGERY | Facility: CLINIC | Age: 33
End: 2018-03-21
Payer: MEDICAID

## 2018-03-21 VITALS
WEIGHT: 150 LBS | BODY MASS INDEX: 25.61 KG/M2 | HEIGHT: 64 IN | DIASTOLIC BLOOD PRESSURE: 70 MMHG | SYSTOLIC BLOOD PRESSURE: 118 MMHG

## 2018-03-21 PROCEDURE — 99024 POSTOP FOLLOW-UP VISIT: CPT

## 2018-04-25 ENCOUNTER — APPOINTMENT (OUTPATIENT)
Dept: SURGERY | Facility: CLINIC | Age: 33
End: 2018-04-25

## 2018-08-06 ENCOUNTER — EMERGENCY (EMERGENCY)
Facility: HOSPITAL | Age: 33
LOS: 1 days | End: 2018-08-06

## 2018-08-06 DIAGNOSIS — Z98.891 HISTORY OF UTERINE SCAR FROM PREVIOUS SURGERY: Chronic | ICD-10-CM

## 2018-08-16 ENCOUNTER — EMERGENCY (EMERGENCY)
Facility: HOSPITAL | Age: 33
LOS: 0 days | Discharge: HOME | End: 2018-08-17
Attending: EMERGENCY MEDICINE | Admitting: EMERGENCY MEDICINE

## 2018-08-16 VITALS
WEIGHT: 156.53 LBS | OXYGEN SATURATION: 99 % | RESPIRATION RATE: 18 BRPM | DIASTOLIC BLOOD PRESSURE: 76 MMHG | SYSTOLIC BLOOD PRESSURE: 122 MMHG | HEART RATE: 77 BPM | HEIGHT: 64 IN | TEMPERATURE: 97 F

## 2018-08-16 DIAGNOSIS — Z98.891 HISTORY OF UTERINE SCAR FROM PREVIOUS SURGERY: Chronic | ICD-10-CM

## 2018-08-16 DIAGNOSIS — R07.89 OTHER CHEST PAIN: ICD-10-CM

## 2018-08-16 DIAGNOSIS — Z79.1 LONG TERM (CURRENT) USE OF NON-STEROIDAL ANTI-INFLAMMATORIES (NSAID): ICD-10-CM

## 2018-08-16 DIAGNOSIS — M25.531 PAIN IN RIGHT WRIST: ICD-10-CM

## 2018-08-16 DIAGNOSIS — G89.29 OTHER CHRONIC PAIN: ICD-10-CM

## 2018-08-16 NOTE — ED PROVIDER NOTE - PHYSICAL EXAMINATION
CONST: Well appearing in NAD  EYES: PERRL, EOMI, Sclera and conjunctiva clear.   ENT: No nasal discharge. TM's clear B/L without drainage. Oropharynx normal appearing, no erythema or exudates. Uvula midline.  NECK: Non-tender, no meningeal signs  CARD: Normal S1 S2; Normal rate and rhythm  RESP: Equal BS B/L, No wheezes, rhonchi or rales. No distress  GI: Soft, non-tender, non-distended.  MS: Normal ROM in all extremities. No midline spinal tenderness.There is left anterior CW tenderness. No calf tenderness or swelling. Right wrist has pain on flexion. No swelling or redness. NV intact  SKIN: Warm, dry, no acute rashes. Good turgor  NEURO: A&Ox3, No focal deficits. Strength 5/5 with no sensory deficits. Steady gait

## 2018-08-16 NOTE — ED ADULT NURSE NOTE - NSIMPLEMENTINTERV_GEN_ALL_ED
Implemented All Universal Safety Interventions:  [place] to call system. Call bell, personal items and telephone within reach. Instruct patient to call for assistance. Room bathroom lighting operational. Non-slip footwear when patient is off stretcher. Physically safe environment: no spills, clutter or unnecessary equipment. Stretcher in lowest position, wheels locked, appropriate side rails in place.

## 2018-08-16 NOTE — ED PROVIDER NOTE - NS ED ROS FT
CONST: No fever, chills or bodyaches  EYES: No pain, redness, drainage or visual changes.  ENT: No ear pain or discharge, nasal discharge or congestion. No sore throat  RESP: No SOB, cough, hemoptysis. No hx of asthma or COPD  GI: No abdominal pain, N/V/D  MS: No joint pain, back pain or extremity pain/injury  SKIN: No rashes  NEURO: No headache, dizziness, paresthesias or LOC

## 2018-08-16 NOTE — ED PROVIDER NOTE - OBJECTIVE STATEMENT
3 weeks of intermittent Left CP. No SOB, NV, radiating pain, cough, fever, diaphoresis.  Pain only comes at rest. Denies smoking, drug use, OCP use, HTN, DM, HLD or FH of CAD. Denies leg pain or swelling. Had this 3 months ago and was seen at [facility] and had a work up (no stress test) and told everything was OK. Never fU with PMD. Also c/o chronic right wrist pain x 1 month w/o injury. Holds 4 yo child a lot in that arm

## 2018-08-16 NOTE — ED PROVIDER NOTE - ATTENDING CONTRIBUTION TO CARE
I personally evaluated the patient. I reviewed the Resident’s or Physician Assistant’s note (as assigned above), and agree with the findings and plan except as documented in my note.  Pt with 3 weeks of left CP, worse with palption . No associated SOB, coughing, f/c, trauma. No use of OCPs. On exam, NAD, talking in full sentences. Normal s1s2, lungs ctab. Plan is EKG, CXR and reassess.

## 2018-08-16 NOTE — ED ADULT TRIAGE NOTE - CHIEF COMPLAINT QUOTE
I have pain here and in my arm (left axillary, anterior thorax) for weeks, I decided to get it checked - patient

## 2018-08-17 VITALS
HEART RATE: 70 BPM | DIASTOLIC BLOOD PRESSURE: 70 MMHG | TEMPERATURE: 97 F | OXYGEN SATURATION: 99 % | RESPIRATION RATE: 18 BRPM | SYSTOLIC BLOOD PRESSURE: 120 MMHG

## 2018-12-26 NOTE — ED PROVIDER NOTE - NS HIV RISK FACTOR YES
Pt tolerated Feraheme well. No adverse reaction noted. Pt education reinforced on possible side effects, what to expect, and when to call her MD. Pt verbalized understanding. I reviewed pt calendar w/ pt and understanding verbalized. IV flushed w/ NS and D/C per protocol.     Declined

## 2019-03-30 ENCOUNTER — EMERGENCY (EMERGENCY)
Facility: HOSPITAL | Age: 34
LOS: 0 days | Discharge: HOME | End: 2019-03-30
Admitting: PHYSICIAN ASSISTANT

## 2019-03-30 VITALS
TEMPERATURE: 97 F | RESPIRATION RATE: 20 BRPM | OXYGEN SATURATION: 99 % | DIASTOLIC BLOOD PRESSURE: 62 MMHG | SYSTOLIC BLOOD PRESSURE: 163 MMHG | HEART RATE: 74 BPM

## 2019-03-30 DIAGNOSIS — S91.319A LACERATION WITHOUT FOREIGN BODY, UNSPECIFIED FOOT, INITIAL ENCOUNTER: ICD-10-CM

## 2019-03-30 DIAGNOSIS — Z98.891 HISTORY OF UTERINE SCAR FROM PREVIOUS SURGERY: Chronic | ICD-10-CM
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
## 2019-03-30 DIAGNOSIS — Z98.891 HISTORY OF UTERINE SCAR FROM PREVIOUS SURGERY: ICD-10-CM
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
## 2019-03-30 DIAGNOSIS — Z23 ENCOUNTER FOR IMMUNIZATION: ICD-10-CM

## 2019-03-30 DIAGNOSIS — S91.312A LACERATION WITHOUT FOREIGN BODY, LEFT FOOT, INITIAL ENCOUNTER: ICD-10-CM

## 2019-03-30 DIAGNOSIS — Z79.899 OTHER LONG TERM (CURRENT) DRUG THERAPY: ICD-10-CM

## 2019-03-30 RX ORDER — TETANUS TOXOID, REDUCED DIPHTHERIA TOXOID AND ACELLULAR PERTUSSIS VACCINE, ADSORBED 5; 2.5; 8; 8; 2.5 [IU]/.5ML; [IU]/.5ML; UG/.5ML; UG/.5ML; UG/.5ML
0.5 SUSPENSION INTRAMUSCULAR ONCE
Qty: 0 | Refills: 0 | Status: COMPLETED | OUTPATIENT
Start: 2019-03-30 | End: 2019-03-30

## 2019-03-30 RX ORDER — CEPHALEXIN 500 MG
1 CAPSULE ORAL
Qty: 40 | Refills: 0 | OUTPATIENT
Start: 2019-03-30 | End: 2019-04-08

## 2019-03-30 RX ADMIN — TETANUS TOXOID, REDUCED DIPHTHERIA TOXOID AND ACELLULAR PERTUSSIS VACCINE, ADSORBED 0.5 MILLILITER(S): 5; 2.5; 8; 8; 2.5 SUSPENSION INTRAMUSCULAR at 21:03

## 2019-03-30 NOTE — ED PROVIDER NOTE - PROGRESS NOTE DETAILS
Pt aware that we will have official radiology read pending, and may result in change of xray read.  Pt voices understanding of use of medications, instructions for care, and reasons to return or to go to ED  ?avulsion finding on xray not c/w injury or clinically correlated to area injured; likely old; will f/u with podiatry  Discussed possible side effects of abx. including but not limited to cdiff/resistance/GI upset. if intolerance, dc use and return to office . Also if there is no improvement, return for re-evaluation. advised to take probiotics.

## 2019-03-30 NOTE — ED ADULT NURSE NOTE - OBJECTIVE STATEMENT
Pt presents to the ED with c/o walking [place] and cut her left foot on the rocks. No bleeding noted at this time.

## 2019-03-30 NOTE — ED PROVIDER NOTE - PHYSICAL EXAMINATION
CONSTITUTIONAL: Well-appearing; well-nourished; in no apparent distress.   MS: Normal ROM in all four extremities; non-tender to palpation; distal pulses are normal.   SKIN: 0.5 lac to medial aspect of left foot over plantar surface proximal first toe; otherwise normal for age and race; warm; dry; good turgor; no apparent lesions or exudate.   NEURO/PSYCH: A & O x 4; grossly unremarkable. mood and manner are appropriate. Grooming and personal hygiene are appropriate. No apparent thoughts of harm to self or others.

## 2019-03-30 NOTE — ED PROVIDER NOTE - NSFOLLOWUPINSTRUCTIONS_ED_ALL_ED_FT
return in 7 days for suture removal    Laceration    A laceration is a cut that goes through all of the layers of the skin and into the tissue that is right under the skin. Some lacerations heal on their own. Others need to be closed with stitches (sutures), staples, skin adhesive strips, or skin glue. Proper laceration care minimizes the risk of infection and helps the laceration to heal better.     SEEK IMMEDIATE MEDICAL CARE IF YOU HAVE THE FOLLOWING SYMPTOMS: swelling around the wound, worsening pain, drainage from the wound, red streaking going away from your wound, inability to move finger or toe near the laceration, or discoloration of skin near the laceration.

## 2019-03-30 NOTE — ED PROVIDER NOTE - OBJECTIVE STATEMENT
pt with no pmhx was walking barefoot on [place] sustained lac to left foot suspects from rock that she saw on ground, but friend noted broken glass nearby and pt is not 100% sure  denies tdap in last 10 years  no other injury/trauma  no foot pain, bleeding subsided  Denies fever/chill/HA/dizziness/chest pain/palpitation/sob/abd pain/n/v/d/ black stool/bloody stool/urinary sxs

## 2019-03-30 NOTE — ED PROVIDER NOTE - NSFOLLOWUPCLINICS_GEN_ALL_ED_FT
A Podiatrist  Podiatry  .  [place]   Phone:   Fax:   Follow Up Time:     [facility]  Podiatry  .  [place]   Phone: [phone]  Fax:   Follow Up Time:

## 2019-04-12 ENCOUNTER — EMERGENCY (EMERGENCY)
Facility: HOSPITAL | Age: 34
LOS: 0 days | Discharge: HOME | End: 2019-04-12
Admitting: EMERGENCY MEDICINE
Payer: MEDICAID

## 2019-04-12 VITALS
OXYGEN SATURATION: 98 % | SYSTOLIC BLOOD PRESSURE: 122 MMHG | HEART RATE: 74 BPM | DIASTOLIC BLOOD PRESSURE: 74 MMHG | TEMPERATURE: 97 F | RESPIRATION RATE: 18 BRPM

## 2019-04-12 DIAGNOSIS — L53.9 ERYTHEMATOUS CONDITION, UNSPECIFIED: ICD-10-CM

## 2019-04-12 DIAGNOSIS — Z98.891 HISTORY OF UTERINE SCAR FROM PREVIOUS SURGERY: Chronic | ICD-10-CM

## 2019-04-12 DIAGNOSIS — Z48.02 ENCOUNTER FOR REMOVAL OF SUTURES: ICD-10-CM

## 2019-04-12 PROCEDURE — 99283 EMERGENCY DEPT VISIT LOW MDM: CPT | Mod: 25

## 2019-04-12 RX ORDER — CEPHALEXIN 500 MG
1 CAPSULE ORAL
Qty: 28 | Refills: 0 | OUTPATIENT
Start: 2019-04-12 | End: 2019-04-18

## 2019-04-12 NOTE — ED PROVIDER NOTE - NSCAREINITIATED _GEN_ER
ANTICOAGULATION SERVICE    Rosanna Sham Lang Cowden" is a 79 y.o. male with PMHx significant for chronic AF ([id number] of 3), HTN who presents to clinic 12/11/2018 for anticoagulation monitoring and adjustment.     Anticoagulation Indication(s):  Afib    Referring Physician:  Dr. Marga Bowers    Goal INR Range:  2-3  Duration of Anticoagulation Therapy:  Indefinite  Time of day dose taken:  PM  Product patient has at home:  warfarin 5 mg (PEACH color)    INR Summary                           Warfarin regimen (mg)  Date INR   A/P   Sun Mon Tue Wed Thu Fri Sat Mg/wk  12/11 2.5 At goal, no change 5 5 2.5 5 2.5 5 2.5 27.5   11/13 2.3 At goal, no change 5 5 2.5 5 2.5 5 2.5 27.5   10/16 2.9 At goal, no change 5 5 2.5 5 2.5 5 2.5 27.5   9/18 2.2 At goal, no change 5 5 2.5 5 2.5 5 2.5 27.5   8/22 2.2 At goal, no change 5 5 2.5 5 2.5 5 2.5 27.5   8/2 2.3 At goal, no change 5 5 2.5 5 2.5 5 2.5 27.5  7/19 2.5 At goal, no change 5 5 2.5 5 2.5 5 2.5 27.5  7/12 4.4 Above goal, hold x 1 5 5 2.5 5 0/2.5 5 2.5 27.5  6/12 2.1 At goal, no change 5 5 2.5 5 2.5 5 2.5 27.5  5/22 2.4 At goal, no change 5 5 2.5 5 2.5 5 2.5 27.5  5/8 1.5 Below goal, 5mg x 1 5 5 5/2.5 5 2.5 5 2.5 27.5  4/5 3.0 At goal, no change 5 5 2.5 5 2.5 5 2.5 27.5  3/8 2.9 At goal, no change 5 5 2.5 5 2.5 5 2.5 27.5  2/22 3.6 Above goal, hold x 1 5 5 2.5 5 2.5 5 2.5 27.5  1/25 2.4 At goal, no change 5 5 2.5 5 2.5 5 2.5 27.5  12/28 2.3 At goal, no change 5 5 2.5 5 2.5 5 2.5 27.5  11/30 2.8 At goal, no change 5 5 2.5 5 2.5 5 2.5 27.5  11/14 2.3 At goal, no change 5 5 2.5 5 2.5 5 2.5 27.5  11/7 2.1 At goal, resume 5 5 2.5 5 2.5 5 2.5 27.5  10/17 2.3 At goal, no change 5 5 2.5 5 2.5 5 2.5 27.5  9/28 2.2 At goal, no change 5 5 2.5 5 2.5 5 2.5 27.5    Lab Results   Component Value Date    RBC 3.92 (L) 11/26/2018    HGB 14.0 11/26/2018    HCT 41.0 11/26/2018    .6 (H) 11/26/2018    MCH 35.6 (H) 11/26/2018    MPV 8.9 11/26/2018    RDW 12.8 11/26/2018     11/26/2018 fever/vomiting/diarrhea. Patient understands dosing directions and information discussed. Dosing schedule and follow up appointment given to patient. Progress note routed to referring physician's office. Patient acknowledges working in consult agreement with pharmacist as referred by his/her physician. Next Clinic Appointment:  1/8    Please call [employer] at [phone] with any questions. Thanks!   Florencia Camarillo, PharmD, [facility]  [facility]   [facility] Ph: [phone]  [facility] Ph: [phone]  12/11/2018 8:40 AM Garima Richardson)

## 2019-04-12 NOTE — ED PROVIDER NOTE - OBJECTIVE STATEMENT
33 year old F with no pmhx presenting for suture removal. Pt was seen on 03/30 after stepping on glass on the beach. tetanus was given. + admits to some redness around suture site. No fever/chills, n/v, pus drainage.

## 2019-04-12 NOTE — ED PROVIDER NOTE - NS ED ROS FT
Constitutional: no fever, chills, no recent weight loss, change in appetite or malaise  Cardiac: No chest pain, SOB or edema.  Respiratory: No cough or respiratory distress  GI: No nausea, vomiting  MS: no pain to back or extremities, no loss of ROM, no weakness  Neuro: No paresthesias, decreased sensation  Skin: + sutures placed over dorsum of left foot  Except as documented in the HPI, all other systems are negative.

## 2019-04-12 NOTE — ED PROVIDER NOTE - PHYSICAL EXAMINATION
CONSTITUTIONAL: Well-appearing; well-nourished; in no apparent distress.   NECK: Supple; non-tender; no cervical lymphadenopathy. No JVD.   CARDIOVASCULAR: Normal S1, S2; no murmurs, rubs, or gallops.   RESPIRATORY: Normal chest excursion with respiration; breath sounds clear and equal bilaterally; no wheezes, rhonchi, or rales.  MS: No evidence of trauma or deformity. Normal ROM in all four extremities; non-tender to palpation; distal pulses are normal.   SKIN: + two sutures intact. + minimal surrounding erythema. No pus/warmth/drainage noted  NEURO/PSYCH: A & O x 4; grossly unremarkable. mood and manner are appropriate. Sensation intact

## 2019-05-14 ENCOUNTER — EMERGENCY (EMERGENCY)
Facility: HOSPITAL | Age: 34
LOS: 0 days | Discharge: HOME | End: 2019-05-14
Attending: EMERGENCY MEDICINE | Admitting: EMERGENCY MEDICINE
Payer: MEDICAID

## 2019-05-14 VITALS
SYSTOLIC BLOOD PRESSURE: 108 MMHG | RESPIRATION RATE: 18 BRPM | TEMPERATURE: 97 F | HEART RATE: 77 BPM | OXYGEN SATURATION: 99 % | DIASTOLIC BLOOD PRESSURE: 64 MMHG

## 2019-05-14 VITALS — WEIGHT: 139.99 LBS

## 2019-05-14 DIAGNOSIS — Z98.891 HISTORY OF UTERINE SCAR FROM PREVIOUS SURGERY: Chronic | ICD-10-CM

## 2019-05-14 DIAGNOSIS — R07.89 OTHER CHEST PAIN: ICD-10-CM

## 2019-05-14 DIAGNOSIS — Z79.2 LONG TERM (CURRENT) USE OF ANTIBIOTICS: ICD-10-CM

## 2019-05-14 PROCEDURE — 93010 ELECTROCARDIOGRAM REPORT: CPT | Mod: 77

## 2019-05-14 PROCEDURE — 93010 ELECTROCARDIOGRAM REPORT: CPT

## 2019-05-14 PROCEDURE — 71046 X-RAY EXAM CHEST 2 VIEWS: CPT | Mod: 26

## 2019-05-14 PROCEDURE — 99284 EMERGENCY DEPT VISIT MOD MDM: CPT

## 2019-05-14 NOTE — ED PROVIDER NOTE - PHYSICAL EXAMINATION
CONST: Well appearing in NAD  EYES: PERRL, EOMI, Sclera and conjunctiva clear.   ENT: No nasal discharge. TM's clear B/L without drainage. Oropharynx normal appearing, no erythema or exudates. No abscess or swelling. Uvula midline.  NECK: Non-tender, no meningeal signs. normal ROM. supple   CARD: Normal S1 S2; Normal rate and rhythm  RESP: Equal BS B/L, No wheezes, rhonchi or rales. No distress  GI: Soft, non-tender, non-distended. no cva tenderness. normal BS  MS: + right anterior chest wall tenderness, Normal ROM in all extremities. No midline spinal tenderness. pulses 2 +. no calf tenderness or swelling  SKIN: Warm, dry, no acute rashes. Good turgor  NEURO: A&Ox3, No focal deficits. Strength 5/5 with no sensory deficits. Steady gait.

## 2019-05-14 NOTE — ED PROVIDER NOTE - CARE PROVIDER_API CALL
Karel Mathews)  Cardiovascular Disease; Internal Medicine  [address] [place] [address]  Phone: [phone]  Fax: [phone]  Follow Up Time:

## 2019-05-14 NOTE — ED PROVIDER NOTE - OBJECTIVE STATEMENT
33 year old female with pmhx of lap joanie, presents with right sided chest pain since last night. pain intermittent, worse with movement. pt has not taken meds for pain. no SOB, cough, fever, chills, calf pain or swelling, recent travel, cigarette use, or FH of cardiac disease. no V/D. no OCP use

## 2019-05-14 NOTE — ED PROVIDER NOTE - NS ED ROS FT
Review of Systems:  	•	CONSTITUTIONAL - no fever, no diaphoresis, no chills  	•	SKIN - no rash  	•	EYES - no eye pain, no blurry vision  	•	ENT - no change in hearing, no sore throat, no ear pain or tinnitus  	•	RESPIRATORY - no shortness of breath, no cough  	•	CARDIAC - + chest pain, no palpitations  	•	GI - no abd pain, no nausea, no vomiting, no diarrhea, no constipation  	•	GENITO-URINARY - no discharge, no dysuria; no hematuria, no increased urinary frequency  	•	MUSCULOSKELETAL - no joint paint, no swelling, no redness  	•	NEUROLOGIC - no weakness, no headache, no paresthesias, no LOC

## 2019-05-14 NOTE — ED PROVIDER NOTE - CLINICAL SUMMARY MEDICAL DECISION MAKING FREE TEXT BOX
Sx improved without intervention, just reassurance. EKG and CXR normal, no signs of ischemia, pericarditis. Patient has no PNA or PTX on CXR, is PERC negative.

## 2019-05-14 NOTE — ED PROVIDER NOTE - ATTENDING CONTRIBUTION TO CARE
Healthy, active 34 yo F, history of lap choly, here for assessment of atypical R sided chest pain x 24 hours. Pain is sharp, intermittent, worse with movement. No associated symptoms. No FH of CAD or sudden death at a young age. No history of PE, leg swelling, immobilization, steroid or hormone use. No cough, fever.     Patient has normal VS, clear lungs, RRR, no LE swelling.     Likely MSk pain, however will get EKG, CXR and reassess.

## 2019-06-03 NOTE — ED PROVIDER NOTE - NS ED ROS FT
Constitutional: no fever, chills, no recent weight loss, change in appetite or malaise  Eyes: no redness/discharge/pain/vision changes  ENT: no rhinorrhea/ear pain/sore throat  Cardiac: No chest pain, SOB or edema.  Respiratory: No cough or respiratory distress  GI: No nausea, vomiting, diarrhea or abdominal pain.  : No dysuria, frequency, urgency or hematuria  MS: no pain to back or extremities, no loss of ROM, no weakness  Neuro: No headache or weakness. No LOC.  Skin: see hpi  Endocrine: No history of thyroid disease or diabetes.  Except as documented in the HPI, all other systems are negative. 03-Jun-2019 14:56

## 2019-09-06 NOTE — ED PROVIDER NOTE - ENMT, MLM
05-Sep-2019 14:33 05-Sep-2019 17:13 Airway patent, Nasal mucosa clear. Mouth with normal mucosa. Throat has no vesicles, no oropharyngeal exudates and uvula is midline.

## 2019-10-30 ENCOUNTER — EMERGENCY (EMERGENCY)
Facility: HOSPITAL | Age: 34
LOS: 0 days | Discharge: HOME | End: 2019-10-30
Admitting: EMERGENCY MEDICINE
Payer: MEDICAID

## 2019-10-30 VITALS — WEIGHT: 160.06 LBS | HEIGHT: 65 IN

## 2019-10-30 VITALS
DIASTOLIC BLOOD PRESSURE: 72 MMHG | OXYGEN SATURATION: 100 % | TEMPERATURE: 97 F | HEART RATE: 80 BPM | SYSTOLIC BLOOD PRESSURE: 124 MMHG | RESPIRATION RATE: 17 BRPM

## 2019-10-30 DIAGNOSIS — M54.9 DORSALGIA, UNSPECIFIED: ICD-10-CM

## 2019-10-30 DIAGNOSIS — M25.50 PAIN IN UNSPECIFIED JOINT: ICD-10-CM

## 2019-10-30 DIAGNOSIS — G89.29 OTHER CHRONIC PAIN: ICD-10-CM

## 2019-10-30 DIAGNOSIS — Z98.891 HISTORY OF UTERINE SCAR FROM PREVIOUS SURGERY: Chronic | ICD-10-CM

## 2019-10-30 PROCEDURE — 99282 EMERGENCY DEPT VISIT SF MDM: CPT

## 2019-10-30 NOTE — ED ADULT TRIAGE NOTE - AS TEMP SITE
Understanding Urinary Tract Infections (UTIs)  Most UTIs are caused by bacteria, although they may also be caused by viruses or fungi.  Bacteria from the bowel are the most common source of infection. The infection may start because of any of the followin oral

## 2019-10-30 NOTE — ED ADULT TRIAGE NOTE - CHIEF COMPLAINT QUOTE
Pt complaining of upper back pain radiating to the b/l legs and intermittent numbness in all extremities x 1 year. denies fall or trauma.

## 2019-10-30 NOTE — ED PROVIDER NOTE - NSFOLLOWUPINSTRUCTIONS_ED_ALL_ED_FT
Chronic Pain    Chronic pain can be defined as pain that is off and on and lasts for 3–6 months or longer. Many things cause chronic pain, which can make it difficult to make a diagnosis. There are many treatment options available for chronic pain. However, finding a treatment that works well for you may require trying various approaches until the right one is found. Many people benefit from a combination of two or more types of treatment to control their pain.    SYMPTOMS  Chronic pain can occur anywhere in the body and can range from mild to very severe. Some types of chronic pain include:    Ø	Headache.  Ø	Low back pain.  Ø	Cancer pain.  Ø	Arthritis pain.  Ø	Neurogenic pain. This is pain resulting from damage to nerves.     People with chronic pain may also have other symptoms such as:    Ø	Depression.  Ø	Anger.  Ø	Insomnia.  Ø	Anxiety.    DIAGNOSIS  Your health care provider will help diagnose your condition over time. In many cases, the initial focus will be on excluding possible conditions that could be causing the pain. Depending on your symptoms, your health care provider may order tests to diagnose your condition. Some of these tests may include:     Ø	Blood tests.    Ø	CT scan.    Ø	MRI.    Ø	X-rays.    Ø	Ultrasounds.    Ø	Nerve conduction studies.      You may need to see a specialist.     TREATMENT  Finding treatment that works well may take time. You may be referred to a pain specialist. He or she may prescribe medicine or therapies, such as:     Ø	Mindful meditation or yoga.  Ø	Shots (injections) of numbing or pain-relieving medicines into the spine or area of pain.  Ø	Local electrical stimulation.  Ø	Acupuncture.    Ø	Massage therapy.    Ø	Aroma, color, light, or sound therapy.    Ø	Biofeedback.    Ø	Working with a physical therapist to keep from getting stiff.    Ø	Regular, gentle exercise.    Ø	Cognitive or behavioral therapy.    Ø	Group support.      Sometimes, surgery may be recommended.     HOME CARE INSTRUCTIONS  Ø	Take all medicines as directed by your health care provider.    Ø	Lessen stress in your life by relaxing and doing things such as listening to calming music.    Ø	Exercise or be active as directed by your health care provider.    Ø	Eat a healthy diet and include things such as vegetables, fruits, fish, and lean meats in your diet.    Ø	Keep all follow-up appointments with your health care provider.    Ø	Attend a support group with others suffering from chronic pain.     SEEK MEDICAL CARE IF:  Ø	Your pain gets worse.    Ø	You develop a new pain that was not there before.    Ø	You cannot tolerate medicines given to you by your health care provider.    Ø	You have new symptoms since your last visit with your health care provider.      SEEK IMMEDIATE MEDICAL CARE IF:  Ø	You feel weak.    Ø	You have decreased sensation or numbness.    Ø	You lose control of bowel or bladder function.    Ø	Your pain suddenly gets much worse.    Ø	You develop shaking.  Ø	You develop chills.   Ø	You develop confusion.  Ø	You develop chest pain.  Ø	You develop shortness of breath.      MAKE SURE YOU:  Ø	Understand these instructions.  Ø	Will watch your condition.  Ø	Will get help right away if you are not doing well or get worse.     ADDITIONAL NOTES AND INSTRUCTIONS    Please follow up with your Primary MD in 24-48 hr.  Seek immediate medical care for any new/worsening signs or symptoms.

## 2019-10-30 NOTE — ED PROVIDER NOTE - CARE PROVIDER_API CALL
Cameron Khoury)  Internal Medicine; Rheumatology  [address] [place] [address]  Phone: [phone]  Fax: [phone]  Follow Up Time:

## 2019-10-30 NOTE — ED ADULT NURSE NOTE - OBJECTIVE STATEMENT
pt presents with lower back pain radiated to bilateral lower extremities  for few months worsening started yesterday . pt reports intermittent of numbness to bilateral later upper extremities. pt denies weakness, trauma or fall in the past

## 2019-10-30 NOTE — ED PROVIDER NOTE - PHYSICAL EXAMINATION
CONSTITUTIONAL: Well-appearing; well-nourished; in no apparent distress.   MS: No evidence of trauma or deformity. Non-tender to palpation. No CVA tenderness. Normal ROM in all four extremities; non-tender to palpation; distal pulses are normal.   SKIN: Normal for age and race; warm; dry; good turgor; no apparent lesions or exudate.   NEURO/PSYCH: A & O x 4; grossly unremarkable. mood and manner are appropriate. Grooming and personal hygiene are appropriate. No apparent thoughts of harm to self or others.

## 2019-10-30 NOTE — ED PROVIDER NOTE - OBJECTIVE STATEMENT
pt c/o back pain and joint pain for several years with prior d/o RA by her PMD via labs, but admits never f/u with anyone for it; takes motrin with some relief. sometimes reports parethesias of all extremities, none currently. pain is sharp, nonradiating, moderate. denies exacerbating or relieving factors  Denies fever/chill/HA/dizziness/chest pain/palpitation/sob/abd pain/n/v/d/ black stool/bloody stool/urinary sxs

## 2019-10-30 NOTE — ED PROVIDER NOTE - PATIENT PORTAL LINK FT
You can access the FollowMyHealth Patient Portal offered by [facility] by registering at the following website: [email or web address]. By joining [employer]’s FollowMyHealth portal, you will also be able to view your health information using other applications (apps) compatible with our system.

## 2020-12-30 NOTE — DISCHARGE NOTE ADULT - PHYSICIAN SECTION COMPLETE
Detail Level: Simple Instructions: This plan will send the code FBSD to the PM system.  DO NOT or CHANGE the price. Yes Price (Do Not Change): 0.00

## 2021-02-22 ENCOUNTER — TRANSCRIPTION ENCOUNTER (OUTPATIENT)
Age: 36
End: 2021-02-22

## 2021-03-10 NOTE — DISCHARGE NOTE ADULT - NS DC ANGIO PCI YN
March 10, 2021     Patient: Tianna Santana   YOB: 1992   Date of Visit: 3/10/2021       To Whom it May Concern:    Tianna Santana was seen in my clinic on 3/10/2021 at 11:15 am.    Tianna may return to work on 3/16/2021 with no heavy lifting, pushing or pulling of more than 10 pounds until 4/8/2021.    Please excuse Tianna for her absence from work on the date listed above to be able to make her appointment.    Sincerely,     Dr. Erick Mayorga     [facility] GENERAL SURGERY       Medical information is confidential and cannot be disclosed without the written consent of the patient or her representative.      
no

## 2021-11-21 ENCOUNTER — EMERGENCY (EMERGENCY)
Facility: HOSPITAL | Age: 36
LOS: 0 days | Discharge: HOME | End: 2021-11-21
Attending: EMERGENCY MEDICINE | Admitting: EMERGENCY MEDICINE
Payer: MEDICAID

## 2021-11-21 VITALS
HEIGHT: 65 IN | RESPIRATION RATE: 16 BRPM | DIASTOLIC BLOOD PRESSURE: 71 MMHG | OXYGEN SATURATION: 100 % | SYSTOLIC BLOOD PRESSURE: 117 MMHG | WEIGHT: 164.91 LBS | HEART RATE: 72 BPM | TEMPERATURE: 98 F

## 2021-11-21 DIAGNOSIS — Z98.891 HISTORY OF UTERINE SCAR FROM PREVIOUS SURGERY: Chronic | ICD-10-CM

## 2021-11-21 DIAGNOSIS — K02.9 DENTAL CARIES, UNSPECIFIED: ICD-10-CM

## 2021-11-21 DIAGNOSIS — K08.89 OTHER SPECIFIED DISORDERS OF TEETH AND SUPPORTING STRUCTURES: ICD-10-CM

## 2021-11-21 PROBLEM — M06.9 RHEUMATOID ARTHRITIS, UNSPECIFIED: Chronic | Status: ACTIVE | Noted: 2019-10-30

## 2021-11-21 PROCEDURE — 99283 EMERGENCY DEPT VISIT LOW MDM: CPT

## 2021-11-21 RX ORDER — AMOXICILLIN 250 MG/5ML
1 SUSPENSION, RECONSTITUTED, ORAL (ML) ORAL
Qty: 21 | Refills: 0
Start: 2021-11-21 | End: 2021-11-27

## 2021-11-21 RX ORDER — ACETAMINOPHEN 500 MG
975 TABLET ORAL ONCE
Refills: 0 | Status: DISCONTINUED | OUTPATIENT
Start: 2021-11-21 | End: 2021-11-21

## 2021-11-21 RX ORDER — IBUPROFEN 200 MG
1 TABLET ORAL
Qty: 21 | Refills: 0
Start: 2021-11-21 | End: 2021-11-27

## 2021-11-21 NOTE — ED PROVIDER NOTE - OBJECTIVE STATEMENT
35 y/o female no Pmhx presents to the ED c/o "I have left upper dental pain for the last week worse since last night. I was previously told that I need an extraction." no hx trauma/ fever/ chills/ difficulty swallowing

## 2021-11-21 NOTE — ED ADULT TRIAGE NOTE - CHIEF COMPLAINT QUOTE
Pt c/o left upper dental pain; pt was told it is a wisdom tooth that needs to be extracted but hasn't been able to make an appt to have it done.

## 2021-11-21 NOTE — ED PROVIDER NOTE - PATIENT PORTAL LINK FT
You can access the FollowMyHealth Patient Portal offered by [facility] by registering at the following website: [email or web address]. By joining [employer]s FollowMyHealth portal, you will also be able to view your health information using other applications (apps) compatible with our system.

## 2021-11-21 NOTE — ED ADULT NURSE NOTE - NSIMPLEMENTINTERV_GEN_ALL_ED
Implemented All Universal Safety Interventions:  [place] to call system. Call bell, personal items and telephone within reach. Instruct patient to call for assistance. Room bathroom lighting operational. Non-slip footwear when patient is off stretcher. Physically safe environment: no spills, clutter or unnecessary equipment. Stretcher in lowest position, wheels locked, appropriate side rails in place.

## 2021-11-21 NOTE — ED PROVIDER NOTE - NSFOLLOWUPCLINICS_GEN_ALL_ED_FT
[facility]  Dental  [address] [place] [id number]  Phone: [phone]  Fax:

## 2021-11-22 ENCOUNTER — EMERGENCY (EMERGENCY)
Facility: HOSPITAL | Age: 36
LOS: 0 days | Discharge: HOME | End: 2021-11-22
Attending: EMERGENCY MEDICINE | Admitting: EMERGENCY MEDICINE
Payer: MEDICAID

## 2021-11-22 VITALS
TEMPERATURE: 98 F | HEART RATE: 66 BPM | OXYGEN SATURATION: 100 % | DIASTOLIC BLOOD PRESSURE: 69 MMHG | WEIGHT: 160.06 LBS | HEIGHT: 65 IN | RESPIRATION RATE: 18 BRPM | SYSTOLIC BLOOD PRESSURE: 112 MMHG

## 2021-11-22 DIAGNOSIS — K08.89 OTHER SPECIFIED DISORDERS OF TEETH AND SUPPORTING STRUCTURES: ICD-10-CM

## 2021-11-22 DIAGNOSIS — Z98.891 HISTORY OF UTERINE SCAR FROM PREVIOUS SURGERY: Chronic | ICD-10-CM

## 2021-11-22 PROCEDURE — 99284 EMERGENCY DEPT VISIT MOD MDM: CPT

## 2021-11-22 NOTE — ED PROVIDER NOTE - OBJECTIVE STATEMENT
35yo female with no significant PMHx presents c/o L upper dental x several days and was told in past she needed an extraction. Was seen in ED yesterday, however dental clinic was closed. Pt reports constant pain, sharp, without aggravating or relieving factors. Pt denies jaw swelling, trismus, fever or chills.

## 2021-11-22 NOTE — ED PROVIDER NOTE - NS ED ROS FT
CONST: No fever, chills or bodyaches  EYES: No pain, redness, drainage or visual changes.  ENT: L upper dental pain  SKIN: No rashes  NEURO: No headache, dizziness, paresthesias or LOC

## 2021-11-22 NOTE — CONSULT NOTE ADULT - SUBJECTIVE AND OBJECTIVE BOX
Patient is a 36y old  Female who presents with a chief complaint of pain on the upper left side     HPI: Pain has been coming and going       PAST MEDICAL & SURGICAL HISTORY:  No pertinent past medical history    Rheumatoid arthritis    H/O  section      ( -) heart valve replacement  ( -) joint replacement  ( -  ) pregnancy    MEDICATIONS  (STANDING):    MEDICATIONS  (PRN):      Allergies    No Known Allergies    Intolerances        FAMILY HISTORY:  No pertinent family history in first degree relatives        *SOCIAL HISTORY: ( -  ) Tobacco; ( -  ) ETOH    *Last Dental Visit: unknown     Vital Signs Last 24 Hrs  T(C): 36.6 (2021 08:21), Max: 36.9 (2021 14:28)  T(F): 97.8 (2021 08:21), Max: 98.4 (2021 14:28)  HR: 66 (2021 08:21) (66 - 72)  BP: 112/69 (2021 08:21) (112/69 - 117/71)  BP(mean): --  RR: 18 (2021 08:21) (16 - 18)  SpO2: 100% (2021 08:21) (100% - 100%)      EOE:  TMJ (  - ) clicks                     ( -  ) pops                     ( -  ) crepitus             Mandible <<FROM>>             Facial bones and MOM <<grossly intact>>             ( -  ) trismus             ( -  ) lymphadenopathy             ( -  ) swelling             ( -  ) asymmetry             ( - ) palpation             (  - ) dyspnea             ( -  ) dysphagia             ( -  ) loss of consciousness    IOE:  <<permanent grossly intact>>           hard/soft palate:  ( -  ) palatal torus, <<No pathology noted>>           tongue/FOM <<No pathology noted>>           labial/buccal mucosa <<No pathology noted>>           ( +  ) percussion #16            ( -  ) palpation           ( -  ) swelling            ( -  ) abscess           ( -  ) sinus tract      Caries: #16 DO         *DENTAL RADIOGRAPHS: 1 periapical radiograph #16     ASSESSMENT: #16 has distal gross carious decay into the pulp   Dx: Irreversible pulpitis       *PLAN: Extraction #16     PROCEDURE:   Treatment consequences explained as per OS sheet dated 00. Consent obtained, side site marked. Administered 3x Carpule Septocaine 4% 1:100,000 epinephrine via buccal and palatal infiltration. Elevated and delivered the tooth via forceps Curetted and irrigated the socket. Post-operative x-ray taken- negative. Hemostasis obtained. Post-operative instructions given verbally and written.     RECOMMENDATIONS:  1) Continue prescribed medications until completion   2) Dental F/U with outpatient dentist for comprehensive dental care.   3) If any difficulty swallowing/breathing, fever occur, return to ER.     Resident Name, pager # Lor Lopez, DDS [phone] Patient is a 36y old  Female who presents with a chief complaint of pain on the upper left side     HPI: Pain has been coming and going       PAST MEDICAL & SURGICAL HISTORY:  No pertinent past medical history    Rheumatoid arthritis    H/O  section      ( -) heart valve replacement  ( -) joint replacement  ( -  ) pregnancy    MEDICATIONS  (STANDING):    MEDICATIONS  (PRN):      Allergies    No Known Allergies    Intolerances        FAMILY HISTORY:  No pertinent family history in first degree relatives        *SOCIAL HISTORY: ( -  ) Tobacco; ( -  ) ETOH    *Last Dental Visit: unknown     Vital Signs Last 24 Hrs  T(C): 36.6 (2021 08:21), Max: 36.9 (2021 14:28)  T(F): 97.8 (2021 08:21), Max: 98.4 (2021 14:28)  HR: 66 (2021 08:21) (66 - 72)  BP: 112/69 (2021 08:21) (112/69 - 117/71)  BP(mean): --  RR: 18 (2021 08:21) (16 - 18)  SpO2: 100% (2021 08:21) (100% - 100%)      EOE:  TMJ (  - ) clicks                     ( -  ) pops                     ( -  ) crepitus             Mandible <<FROM>>             Facial bones and MOM <<grossly intact>>             ( -  ) trismus             ( -  ) lymphadenopathy             ( -  ) swelling             ( -  ) asymmetry             ( - ) palpation             (  - ) dyspnea             ( -  ) dysphagia             ( -  ) loss of consciousness    IOE:  <<permanent grossly intact>>           hard/soft palate:  ( -  ) palatal torus, <<No pathology noted>>           tongue/FOM <<No pathology noted>>           labial/buccal mucosa <<No pathology noted>>           ( +  ) percussion #16            ( -  ) palpation           ( -  ) swelling            ( -  ) abscess           ( -  ) sinus tract      Caries: #16 DO         *DENTAL RADIOGRAPHS: 1 periapical radiograph #16     ASSESSMENT: #16 has distal gross carious decay into the pulp   Dx: Irreversible pulpitis       *PLAN: Extraction #16     PROCEDURE:   Treatment consequences explained as per OS sheet dated 00. Consent obtained, side site marked. Administered 3x Carpule Septocaine 4% 1:100,000 epinephrine via buccal and palatal infiltration. Elevated and delivered the tooth via forceps Curetted and irrigated the socket. Post-operative x-ray taken- negative. Hemostasis obtained. Post-operative instructions given verbally and written.     RECOMMENDATIONS:  1) Continue Amoxicillin 500 mg and Ibuprofen 600 mg as prescribed   2) Dental F/U with outpatient dentist for comprehensive dental care.   3) If any difficulty swallowing/breathing, fever occur, return to ER.     Resident Name, pager # Lor Lopez, DDS [phone]

## 2021-11-22 NOTE — ED PROVIDER NOTE - PHYSICAL EXAMINATION
CONST: Well appearing in NAD  EYES: PERRL, EOMI, Sclera and conjunctiva clear.   ENT: Oropharynx normal appearing, no erythema or exudates. Uvula midline. L upper molar ttp. No swelling  CARD: Normal S1 S2; Normal rate and rhythm  SKIN: Warm, dry, no acute rashes. Good turgor

## 2022-04-22 NOTE — ED ADULT NURSE NOTE - NS ED NURSE LEVEL OF CONSCIOUSNESS AFFECT
Secondary to acute dehydration and Salmonella food poisoning, improving, will have updated lab work ordered by nephrology completed today, labs done last week continued to show gradual improvement however patient is with persistent nausea and vomiting as stated above.   We will follow along with nephrology Calm

## 2022-06-10 ENCOUNTER — EMERGENCY (EMERGENCY)
Facility: HOSPITAL | Age: 37
LOS: 0 days | Discharge: HOME | End: 2022-06-10
Attending: EMERGENCY MEDICINE | Admitting: EMERGENCY MEDICINE
Payer: MEDICAID

## 2022-06-10 VITALS
HEART RATE: 80 BPM | OXYGEN SATURATION: 99 % | HEIGHT: 65 IN | RESPIRATION RATE: 18 BRPM | TEMPERATURE: 99 F | SYSTOLIC BLOOD PRESSURE: 112 MMHG | DIASTOLIC BLOOD PRESSURE: 67 MMHG

## 2022-06-10 DIAGNOSIS — K08.89 OTHER SPECIFIED DISORDERS OF TEETH AND SUPPORTING STRUCTURES: ICD-10-CM

## 2022-06-10 DIAGNOSIS — M06.9 RHEUMATOID ARTHRITIS, UNSPECIFIED: ICD-10-CM

## 2022-06-10 DIAGNOSIS — K03.81 CRACKED TOOTH: ICD-10-CM

## 2022-06-10 DIAGNOSIS — Z98.891 HISTORY OF UTERINE SCAR FROM PREVIOUS SURGERY: ICD-10-CM

## 2022-06-10 DIAGNOSIS — Z98.891 HISTORY OF UTERINE SCAR FROM PREVIOUS SURGERY: Chronic | ICD-10-CM

## 2022-06-10 PROCEDURE — 99282 EMERGENCY DEPT VISIT SF MDM: CPT

## 2022-06-10 NOTE — ED PROVIDER NOTE - PHYSICAL EXAMINATION
Physical Exam    Vital Signs: I have reviewed the initial vital signs.  Constitutional: well-nourished, appears stated age, no acute distress  Dental: +pain to tooth #29  Neuro: AOx3, No focal deficits noted

## 2022-06-10 NOTE — ED PROVIDER NOTE - NS ED ATTENDING STATEMENT MOD
This was a shared visit with the CARLEE. I reviewed and verified the documentation and independently performed the documented:

## 2022-06-10 NOTE — CONSULT NOTE ADULT - SUBJECTIVE AND OBJECTIVE BOX
Patient is a 37y old  Female who presents with a chief complaint of dental pain in lower right quadrant. Claims she bit on an olive pit three days and fractured her molar.     HPI:      PAST MEDICAL & SURGICAL HISTORY:  No pertinent past medical history      Rheumatoid arthritis      H/O  section        (   ) heart valve replacement  (   ) joint replacement  (   ) pregnancy    MEDICATIONS  (STANDING):    MEDICATIONS  (PRN):      Allergies    No Known Allergies    Intolerances        FAMILY HISTORY:      *SOCIAL HISTORY: (   ) Tobacco; (   ) ETOH    *Last Dental Visit:    Vital Signs Last 24 Hrs  T(C): 37 (10 Armaan 2022 14:41), Max: 37 (10 Armaan 2022 14:41)  T(F): 98.6 (10 Armaan 2022 14:41), Max: 98.6 (10 Armaan 2022 14:41)  HR: 80 (10 Armaan 2022 14:) (80 - 80)  BP: 112/67 (10 Armaan 2022 14:41) (112/67 - 112/67)  BP(mean): --  RR: 18 (10 Armaan 2022 14:41) (18 - 18)  SpO2: 99% (10 Armaan 2022 14:41) (99% - 99%)    LABS:                  EOE:  TMJ ( -  ) clicks                     (  - ) pops                     (  - ) crepitus             Mandible <<FROM>>             Facial bones and MOM <<grossly intact>>             ( -  ) trismus             ( -  ) lymphadenopathy             ( -  ) swelling             ( -  ) asymmetry             ( -  ) palpation             ( -  ) dyspnea             ( -  ) dysphagia             ( -  ) loss of consciousness    IOE:  <<permanent/primary/mixed>> dentition: <<grossly intact>> OR <<multiple carious teeth>> OR <<multiple missing teeth>>           hard/soft palate:  (  - ) palatal torus, <<No pathology noted>>           tongue/FOM <<No pathology noted>>           labial/buccal mucosa <<No pathology noted>>           ( -  ) percussion           ( -  ) palpation           ( -  ) swelling            ( -  ) abscess           ( -  ) sinus tract    Dentition present: <<   >>  Mobility: <<  >>  Caries: <<   >>         *DENTAL RADIOGRAPHS:1 diagnostic periapical     RADIOLOGY & ADDITIONAL STUDIES:    *ASSESSMENT: Upon radiographic and clinical evaluation, fracture noted in #30. Restorable either with new restoration or root canal therapy and crown depending on extent of previous restoration.       *PLAN: Informed patient #30 requires excavation and treatment by primary dentist. Will prescribe amoxicillin 500 mg and ibuprofen 600 mg for pain.     PROCEDURE:   Prescribed amoxicillin 500 mg and ibuprofen 600 mg.     RECOMMENDATIONS:  1) Return to primary dentist for treatment.   2) Dental F/U with outpatient dentist for comprehensive dental care.   3) If any difficulty swallowing/breathing, fever occur, return to ER.     Mervin Lane DDS

## 2022-06-10 NOTE — ED PROVIDER NOTE - ATTENDING APP SHARED VISIT CONTRIBUTION OF CARE
37-year-old female presenting for toothache after it cracked.  Denies any additional complaints.  Well-appearing female no acute distress, exam shows fracture of tooth #29, multiple other restorations are seen, no palpable abscess, nml floor of the mouth, normal phonation, no facial swelling or cellulitis.  Transfer to dental clinic for further evaluation care.

## 2022-06-10 NOTE — ED PROVIDER NOTE - OBJECTIVE STATEMENT
38 yo F  c/o right lower dental pain  x 3 days. Patient bit into an olive pit and broke her tooth. No fever.

## 2022-08-11 NOTE — ED ADULT NURSE NOTE - CAS TRG GENERAL AIRWAY, MLM
Pili Falcon is a 49 year old who is here for follow up    DM on metformin 500 mg qd   Metformin causes her to nausea   Lost 17 lb since 3/22   Walking but no jumping jacks      GREGOR does not like the CPAP machine   Still has the machine at home    Stress with her mother who is losing wt and has anemia   Brother had bowel obstruction ? Possible cancer colon     Missed work since Monday for 3 days    Due to sharp abdominal pain  Constipation admits     Review of systems:  ENT Problem(s): negative  Cardiovascular problem(s): negative  Respiratory problem(s): negative  Gastro-intestinal problem(s): negative  Musculoskeletal problem(s): negative  Endocrine problem(s): negative    Patient Active Problem List    Diagnosis Date Noted   • Sublingual gland swelling 02/25/2021     Priority: Low   • Numbness 12/01/2020     Priority: Low   • Left leg numbness 04/04/2019     Priority: Low   • Seasonal allergic rhinitis 04/04/2019     Priority: Low   • Dizziness 04/04/2019     Priority: Low   • Type 2 diabetes mellitus (CMS/[facility]) 11/01/2018     Priority: Low   • HTN (hypertension) 06/05/2017     Priority: Low   • Low back pain 02/27/2017     Priority: Low   • Morbid obesity (CMS/[facility]) 03/10/2014     Priority: Low   • Obstructive sleep apnea 04/25/2013     Priority: Low   • Leiomyoma of uterus 04/25/2013     Priority: Low   • Childhood asthma 02/07/2013     Priority: Low       Current Outpatient Medications   Medication Sig   • metFORMIN (GLUCOPHAGE) 500 MG tablet TAKE 1 TABLET BY MOUTH EVERY DAY   • atenolol (TENORMIN) 50 MG tablet TAKE 1 TABLET BY MOUTH EVERY DAY   • OneTouch Ultra test strip TEST ONCE DAILY   • Lancets (OneTouch Delica Plus [id number]) Misc TEST ONCE DAILY   • fluticasone (FLONASE) 50 MCG/ACT nasal spray Spray 1 spray in each nostril daily.   • cetirizine (ZYRTEC ALLERGY) 10 MG tablet Take 1 tablet by mouth daily.     No current facility-administered medications for this visit.       ALLERGIES:  No Known  Allergies    Past Medical History:   Diagnosis Date   • Diabetes mellitus (CMS/HCC)    • Essential (primary) hypertension    • Obesity        Past Surgical History:   Procedure Laterality Date   • No past surgeries         Social History     Socioeconomic History   • Marital status: Single     Spouse name: Not on file   • Number of children: Not on file   • Years of education: Not on file   • Highest education level: Not on file   Occupational History   • Not on file   Tobacco Use   • Smoking status: Never Smoker   • Smokeless tobacco: Never Used   Vaping Use   • Vaping Use: never used   Substance and Sexual Activity   • Alcohol use: Yes     Comment: rarely   • Drug use: No   • Sexual activity: Not on file   Other Topics Concern   • Not on file   Social History Narrative   • Not on file     Social Determinants of Health     Financial Resource Strain: Not on file   Food Insecurity: Not on file   Transportation Needs: Not on file   Physical Activity: Not on file   Stress: Not on file   Social Connections: Not on file   Intimate Partner Violence: Not on file       Family History   Problem Relation Age of Onset   • Diabetes Mother    • Hypertension Mother    • Diabetes Father    • Hypertension Father        Patient's medications, allergies, past medical, surgical, social and family histories were reviewed and updated as appropriate.     Health Maintenance Summary     Hepatitis B Vaccine (1 of 3 - 3-dose series)  Overdue - never done    Colorectal Cancer Screen- (Colonoscopy - Every 10 Years)  Ordered on 3/29/2022    Diabetes Eye Exam (Yearly)  Overdue since 1/19/2021    Breast Cancer Screening (Yearly)  Ordered on 8/11/2022    Diabetes A1C (Every 6 Months)  Ordered on 8/11/2022    Influenza Vaccine (1)  Next due on 9/1/2022    DM/CKD Microalbumin (Yearly)  Next due on 3/29/2023    DM/CKD GFR (Yearly)  Next due on 3/29/2023    Diabetes Foot Exam (Yearly)  Next due on 3/29/2023    Pneumococcal Vaccine 0-64 (2 - PPSV23 or  PCV20)  Next due on 3/29/2023    Depression Screening (Yearly)  Next due on 8/11/2023    DTaP/Tdap/Td Vaccine (2 - Td or Tdap)  Next due on 4/8/2026    Cervical Cancer Screen 30-64 - (PAP/HPV Co-Testing - Every 5 Years)  Next due on 12/10/2026    COVID-19 Vaccine   Completed    Meningococcal Vaccine   Aged Out    HPV Vaccine   Aged Out              Physical Examination   Blood pressure 134/80, pulse 75, temperature 97.6 °F (36.4 °C), resp. rate 18, height 5' 2\" (1.575 m), weight 100.7 kg (222 lb), last menstrual period 02/26/2022.  Well nourished, comfortable, in no acute distress at rest  HEENT normal  No JVD, normal carotid pulse, no thyromegaly  Lungs clear to auscultation, normal respiratory effort  Normal heart sounds S1 S2, regular rate and rhythm no murmur or gallop  Abdomen soft, not distended, normal bowel sounds  Extremities: no edema, no cyanosis, normal pedal pulses  Alert, oriented x3, normal motor function grossly, normal gait  Skin: normal, no abnormal lesions      Lab Services on 03/29/2022   Component Date Value   • Sodium 03/29/2022 138    • Potassium 03/29/2022 4.5    • Chloride 03/29/2022 107    • Carbon Dioxide 03/29/2022 25    • Anion Gap 03/29/2022 11    • Glucose 03/29/2022 84    • BUN 03/29/2022 12    • Creatinine 03/29/2022 0.81    • Glomerular Filtration Ra* 03/29/2022 >90    • BUN/ Creatinine Ratio 03/29/2022 15    • Calcium 03/29/2022 8.7    • Bilirubin, Total 03/29/2022 0.3    • GOT/AST 03/29/2022 22    • GPT/ALT 03/29/2022 25    • Alkaline Phosphatase 03/29/2022 67    • Albumin 03/29/2022 3.6    • Protein, Total 03/29/2022 7.4    • Globulin 03/29/2022 3.8    • A/G Ratio 03/29/2022 0.9 (A)   • Hemoglobin A1C 03/29/2022 6.0 (A)   • Cholesterol 03/29/2022 199    • Triglycerides 03/29/2022 67    • HDL 03/29/2022 35 (A)   • LDL 03/29/2022 151 (A)   • Non-HDL Cholesterol 03/29/2022 164    • Cholesterol/ HDL Ratio 03/29/2022 5.7 (A)   • Microalbumin, Urine 03/29/2022 0.90    • Creatinine,  Urine 03/29/2022 157.00    • Microalbumin/ Creatinine* 03/29/2022 5.7        Assessment and Plan:    Postmenopausal    - MAMMO SCREENING BILATERAL; Future  - OPEN ACCESS COLONOSCOPY    Type 2 diabetes mellitus without complication, without long-term current use of insulin (CMS/[facility])  Controlled  Continue with metformin  - GLYCOHEMOGLOBIN; Future  - SERVICE TO OPHTHALMOLOGY; Future    Screening for colon cancer  Colonoscopy    Obstructive sleep apnea  Continue use of CPAP machine    Morbid obesity (CMS/[facility])  10 pounds weight loss    Primary hypertension  Controlled    Constipation, unspecified constipation type  Rule out  - THYROID STIMULATING HORMONE; Future      Return to clinic in 3 months.    Spent 30 min with patient for coordination of care.    Ruth De La Torre MD   [employer]

## 2023-06-04 ENCOUNTER — EMERGENCY (EMERGENCY)
Facility: HOSPITAL | Age: 38
LOS: 0 days | Discharge: HOME | End: 2023-06-04
Attending: EMERGENCY MEDICINE
Payer: COMMERCIAL

## 2023-06-04 VITALS
SYSTOLIC BLOOD PRESSURE: 135 MMHG | HEART RATE: 82 BPM | OXYGEN SATURATION: 99 % | HEIGHT: 65 IN | TEMPERATURE: 98 F | DIASTOLIC BLOOD PRESSURE: 88 MMHG | WEIGHT: 167.99 LBS | RESPIRATION RATE: 18 BRPM

## 2023-06-04 DIAGNOSIS — M06.9 RHEUMATOID ARTHRITIS, UNSPECIFIED: ICD-10-CM

## 2023-06-04 DIAGNOSIS — N89.8 OTHER SPECIFIED NONINFLAMMATORY DISORDERS OF VAGINA: ICD-10-CM

## 2023-06-04 DIAGNOSIS — Z11.3 ENCOUNTER FOR SCREENING FOR INFECTIONS WITH A PREDOMINANTLY SEXUAL MODE OF TRANSMISSION: ICD-10-CM

## 2023-06-04 DIAGNOSIS — Z87.59 PERSONAL HISTORY OF OTHER COMPLICATIONS OF PREGNANCY, CHILDBIRTH AND THE PUERPERIUM: ICD-10-CM

## 2023-06-04 DIAGNOSIS — Z98.891 HISTORY OF UTERINE SCAR FROM PREVIOUS SURGERY: Chronic | ICD-10-CM

## 2023-06-04 LAB — HIV 1 & 2 AB SERPL IA.RAPID: SIGNIFICANT CHANGE UP

## 2023-06-04 PROCEDURE — 87591 N.GONORRHOEAE DNA AMP PROB: CPT

## 2023-06-04 PROCEDURE — 87491 CHLMYD TRACH DNA AMP PROBE: CPT

## 2023-06-04 PROCEDURE — 86703 HIV-1/HIV-2 1 RESULT ANTBDY: CPT

## 2023-06-04 PROCEDURE — 99284 EMERGENCY DEPT VISIT MOD MDM: CPT

## 2023-06-04 PROCEDURE — 36415 COLL VENOUS BLD VENIPUNCTURE: CPT

## 2023-06-04 PROCEDURE — 99283 EMERGENCY DEPT VISIT LOW MDM: CPT

## 2023-06-04 PROCEDURE — 86780 TREPONEMA PALLIDUM: CPT

## 2023-06-04 RX ORDER — FLUCONAZOLE 150 MG/1
150 TABLET ORAL ONCE
Refills: 0 | Status: DISCONTINUED | OUTPATIENT
Start: 2023-06-04 | End: 2023-06-04

## 2023-06-04 NOTE — ED PROVIDER NOTE - CLINICAL SUMMARY MEDICAL DECISION MAKING FREE TEXT BOX
31-year-old female presents to the ED for STD testing.  Patient reports her partner tested positive for HPV and another unknown STD.  Denies any vaginal bleeding, discharge.  Declined empiric antibiotics in the ED.  We will follow-up with results prior to treatment in the outpatient setting.  Discharged home.

## 2023-06-04 NOTE — ED PROVIDER NOTE - PATIENT PORTAL LINK FT
You can access the FollowMyHealth Patient Portal offered by [facility] by registering at the following website: [email or web address]. By joining [employer]’s FollowMyHealth portal, you will also be able to view your health information using other applications (apps) compatible with our system.

## 2023-06-04 NOTE — ED ADULT NURSE NOTE - SUICIDE SCREENING QUESTION 1
Telephone Encounter by Venus Man, [facility] at 08/21/18 09:58 AM     Author:  Venus Man MA Service:  (none) Author Type:  Medical Assistant     Filed:  08/21/18 09:59 AM Encounter Date:  8/20/2018 Status:  Signed     :  Venus Man MA (Medical Assistant)                Dr Yazan Servin  Any recommendations  Please advise  Thanks[[id number]]          Revision History        User Key Date/Time User Provider Type Action    > JH1.1 08/21/18 09:59 AM Venus Man MA Medical Assistant Sign    M - Manual No

## 2023-06-04 NOTE — ED PROVIDER NOTE - PROGRESS NOTE DETAILS
Shay: Patient was offered empiric antibiotic treatment for STDs in ED however she declined.  She prefers to follow-up on results before starting treatment.

## 2023-06-04 NOTE — ED ADULT NURSE NOTE - NSFALLUNIVINTERV_ED_ALL_ED
Bed/Stretcher in lowest position, wheels locked, appropriate side rails in place/Call bell, personal items and telephone in reach/Instruct patient to call for assistance before getting out of bed/chair/stretcher/Non-slip footwear applied when patient is off stretcher/[place] to call system/Physically safe environment - no spills, clutter or unnecessary equipment/Purposeful proactive rounding/Room/bathroom lighting operational, light cord in reach

## 2023-06-04 NOTE — ED PROVIDER NOTE - OBJECTIVE STATEMENT
37F 38-year-old female no reported past medical history presents for STD testing.  Patient reports she received a call from her sexual partner today telling her he tested positive for HPV and another unknown STD.  Patient reports she she has been having some scant vaginal discharge yellow in color.  Denies vaginal bleeding, pelvic pain, abdominal pain, flank pain, fever, nausea, vomiting.  Denies history of STDs.  Patient is sexually active with 1 male partner.

## 2023-06-04 NOTE — ED PROVIDER NOTE - NSFOLLOWUPINSTRUCTIONS_ED_ALL_ED_FT
Please follow up with your primary care doctor.    A sexually transmitted disease (STD) is a disease or infection that may be passed (transmitted) from person to person, usually during sexual activity. This may happen by way of saliva, semen, blood, vaginal mucus, or urine. Symptoms vary depending on the type of STD acquired and may include pain in the groin, discharge, and lesions or a rash. If you are started on an antibiotic, take it exactly as instructed. Avoid sexual contact of any kind until cleared by a health care professional. Contact your sexual partner(s) to inform them of your diagnosis so that they may be tested and treated as well.    SEEK IMMEDIATE MEDICAL CARE IF YOU HAVE ANY OF THE FOLLOWING SYMPTOMS: severe abdominal pain, high fever, nausea/vomiting, or unintended weight loss.

## 2023-06-04 NOTE — ED PROVIDER NOTE - PHYSICAL EXAMINATION
VITAL SIGNS: I have reviewed nursing notes and confirm.  CONSTITUTIONAL: well-appearing, non-toxic, NAD  SKIN: Warm dry, normal skin turgor  HEAD: NCAT  EYES: EOMI, no scleral icterus  ENT: Moist mucous membranes, normal pharynx with no erythema or exudates  NECK: Supple; non tender. Full ROM. No cervical LAD  CARD: RRR, no murmurs, rubs or gallops  RESP: clear to ausculation b/l.  No rales, rhonchi, or wheezing.  ABD: soft, non-tender, non-distended, no rebound or guarding. No CVA tenderness  : supervised by RICH Mosley - normal appearing external female genitalia, no rash, no vaginal discharge.  EXT: Full ROM, no bony tenderness, no pedal edema, no calf tenderness  NEURO: Normal gait.  PSYCH: Cooperative, appropriate.

## 2023-06-05 LAB
C TRACH RRNA SPEC QL NAA+PROBE: SIGNIFICANT CHANGE UP
N GONORRHOEA RRNA SPEC QL NAA+PROBE: SIGNIFICANT CHANGE UP
SPECIMEN SOURCE: SIGNIFICANT CHANGE UP
T PALLIDUM AB TITR SER: NEGATIVE — SIGNIFICANT CHANGE UP

## 2023-06-06 ENCOUNTER — TRANSCRIPTION ENCOUNTER (OUTPATIENT)
Age: 38
End: 2023-06-06

## 2023-07-24 ENCOUNTER — EMERGENCY (EMERGENCY)
Facility: HOSPITAL | Age: 38
LOS: 0 days | Discharge: HOME | End: 2023-07-24
Attending: EMERGENCY MEDICINE
Payer: COMMERCIAL

## 2023-07-24 VITALS
RESPIRATION RATE: 18 BRPM | TEMPERATURE: 98 F | HEART RATE: 77 BPM | DIASTOLIC BLOOD PRESSURE: 56 MMHG | OXYGEN SATURATION: 99 % | SYSTOLIC BLOOD PRESSURE: 99 MMHG

## 2023-07-24 VITALS
HEIGHT: 65 IN | TEMPERATURE: 99 F | DIASTOLIC BLOOD PRESSURE: 77 MMHG | SYSTOLIC BLOOD PRESSURE: 119 MMHG | WEIGHT: 166.01 LBS | HEART RATE: 72 BPM | OXYGEN SATURATION: 100 %

## 2023-07-24 DIAGNOSIS — R51.9 HEADACHE, UNSPECIFIED: ICD-10-CM

## 2023-07-24 DIAGNOSIS — Z90.49 ACQUIRED ABSENCE OF OTHER SPECIFIED PARTS OF DIGESTIVE TRACT: ICD-10-CM

## 2023-07-24 DIAGNOSIS — M06.9 RHEUMATOID ARTHRITIS, UNSPECIFIED: ICD-10-CM

## 2023-07-24 DIAGNOSIS — Z98.891 HISTORY OF UTERINE SCAR FROM PREVIOUS SURGERY: Chronic | ICD-10-CM

## 2023-07-24 DIAGNOSIS — R11.2 NAUSEA WITH VOMITING, UNSPECIFIED: ICD-10-CM

## 2023-07-24 DIAGNOSIS — G44.89 OTHER HEADACHE SYNDROME: ICD-10-CM

## 2023-07-24 LAB
ALBUMIN SERPL ELPH-MCNC: 4.5 G/DL — SIGNIFICANT CHANGE UP (ref 3.5–5.2)
ALP SERPL-CCNC: 71 U/L — SIGNIFICANT CHANGE UP (ref 30–115)
ALT FLD-CCNC: 35 U/L — SIGNIFICANT CHANGE UP (ref 0–41)
ANION GAP SERPL CALC-SCNC: 10 MMOL/L — SIGNIFICANT CHANGE UP (ref 7–14)
AST SERPL-CCNC: 65 U/L — HIGH (ref 0–41)
BASOPHILS # BLD AUTO: 0.05 K/UL — SIGNIFICANT CHANGE UP (ref 0–0.2)
BASOPHILS NFR BLD AUTO: 0.4 % — SIGNIFICANT CHANGE UP (ref 0–1)
BILIRUB SERPL-MCNC: 0.4 MG/DL — SIGNIFICANT CHANGE UP (ref 0.2–1.2)
BUN SERPL-MCNC: 12 MG/DL — SIGNIFICANT CHANGE UP (ref 10–20)
CALCIUM SERPL-MCNC: 9.5 MG/DL — SIGNIFICANT CHANGE UP (ref 8.4–10.4)
CHLORIDE SERPL-SCNC: 103 MMOL/L — SIGNIFICANT CHANGE UP (ref 98–110)
CO2 SERPL-SCNC: 25 MMOL/L — SIGNIFICANT CHANGE UP (ref 17–32)
CREAT SERPL-MCNC: 0.6 MG/DL — LOW (ref 0.7–1.5)
EGFR: 118 ML/MIN/1.73M2 — SIGNIFICANT CHANGE UP
EOSINOPHIL # BLD AUTO: 0.13 K/UL — SIGNIFICANT CHANGE UP (ref 0–0.7)
EOSINOPHIL NFR BLD AUTO: 1 % — SIGNIFICANT CHANGE UP (ref 0–8)
GLUCOSE SERPL-MCNC: 96 MG/DL — SIGNIFICANT CHANGE UP (ref 70–99)
HCT VFR BLD CALC: 39.8 % — SIGNIFICANT CHANGE UP (ref 37–47)
HGB BLD-MCNC: 13.5 G/DL — SIGNIFICANT CHANGE UP (ref 12–16)
IMM GRANULOCYTES NFR BLD AUTO: 0.4 % — HIGH (ref 0.1–0.3)
LYMPHOCYTES # BLD AUTO: 2.87 K/UL — SIGNIFICANT CHANGE UP (ref 1.2–3.4)
LYMPHOCYTES # BLD AUTO: 21.5 % — SIGNIFICANT CHANGE UP (ref 20.5–51.1)
MCHC RBC-ENTMCNC: 28.1 PG — SIGNIFICANT CHANGE UP (ref 27–31)
MCHC RBC-ENTMCNC: 33.9 G/DL — SIGNIFICANT CHANGE UP (ref 32–37)
MCV RBC AUTO: 82.7 FL — SIGNIFICANT CHANGE UP (ref 81–99)
MONOCYTES # BLD AUTO: 0.6 K/UL — SIGNIFICANT CHANGE UP (ref 0.1–0.6)
MONOCYTES NFR BLD AUTO: 4.5 % — SIGNIFICANT CHANGE UP (ref 1.7–9.3)
NEUTROPHILS # BLD AUTO: 9.63 K/UL — HIGH (ref 1.4–6.5)
NEUTROPHILS NFR BLD AUTO: 72.2 % — SIGNIFICANT CHANGE UP (ref 42.2–75.2)
NRBC # BLD: 0 /100 WBCS — SIGNIFICANT CHANGE UP (ref 0–0)
PLATELET # BLD AUTO: 301 K/UL — SIGNIFICANT CHANGE UP (ref 130–400)
PMV BLD: 10.8 FL — HIGH (ref 7.4–10.4)
POTASSIUM SERPL-MCNC: 4.2 MMOL/L — SIGNIFICANT CHANGE UP (ref 3.5–5)
POTASSIUM SERPL-SCNC: 4.2 MMOL/L — SIGNIFICANT CHANGE UP (ref 3.5–5)
PROT SERPL-MCNC: 7.8 G/DL — SIGNIFICANT CHANGE UP (ref 6–8)
RBC # BLD: 4.81 M/UL — SIGNIFICANT CHANGE UP (ref 4.2–5.4)
RBC # FLD: 12.4 % — SIGNIFICANT CHANGE UP (ref 11.5–14.5)
SODIUM SERPL-SCNC: 138 MMOL/L — SIGNIFICANT CHANGE UP (ref 135–146)
WBC # BLD: 13.34 K/UL — HIGH (ref 4.8–10.8)
WBC # FLD AUTO: 13.34 K/UL — HIGH (ref 4.8–10.8)

## 2023-07-24 PROCEDURE — 96375 TX/PRO/DX INJ NEW DRUG ADDON: CPT

## 2023-07-24 PROCEDURE — 80053 COMPREHEN METABOLIC PANEL: CPT

## 2023-07-24 PROCEDURE — 85025 COMPLETE CBC W/AUTO DIFF WBC: CPT

## 2023-07-24 PROCEDURE — 96374 THER/PROPH/DIAG INJ IV PUSH: CPT

## 2023-07-24 PROCEDURE — 99284 EMERGENCY DEPT VISIT MOD MDM: CPT

## 2023-07-24 PROCEDURE — 70450 CT HEAD/BRAIN W/O DYE: CPT | Mod: 26,MA

## 2023-07-24 PROCEDURE — 70450 CT HEAD/BRAIN W/O DYE: CPT | Mod: MA

## 2023-07-24 PROCEDURE — 99284 EMERGENCY DEPT VISIT MOD MDM: CPT | Mod: 25

## 2023-07-24 PROCEDURE — 36415 COLL VENOUS BLD VENIPUNCTURE: CPT

## 2023-07-24 RX ORDER — SODIUM CHLORIDE 9 MG/ML
1000 INJECTION INTRAMUSCULAR; INTRAVENOUS; SUBCUTANEOUS ONCE
Refills: 0 | Status: DISCONTINUED | OUTPATIENT
Start: 2023-07-24 | End: 2023-07-24

## 2023-07-24 RX ORDER — KETOROLAC TROMETHAMINE 30 MG/ML
15 SYRINGE (ML) INJECTION ONCE
Refills: 0 | Status: DISCONTINUED | OUTPATIENT
Start: 2023-07-24 | End: 2023-07-24

## 2023-07-24 RX ORDER — METOCLOPRAMIDE HCL 10 MG
10 TABLET ORAL ONCE
Refills: 0 | Status: COMPLETED | OUTPATIENT
Start: 2023-07-24 | End: 2023-07-24

## 2023-07-24 RX ORDER — METOCLOPRAMIDE HCL 10 MG
10 TABLET ORAL ONCE
Refills: 0 | Status: DISCONTINUED | OUTPATIENT
Start: 2023-07-24 | End: 2023-07-24

## 2023-07-24 RX ORDER — SODIUM CHLORIDE 9 MG/ML
1000 INJECTION, SOLUTION INTRAVENOUS ONCE
Refills: 0 | Status: COMPLETED | OUTPATIENT
Start: 2023-07-24 | End: 2023-07-24

## 2023-07-24 RX ORDER — FAMOTIDINE 10 MG/ML
20 INJECTION INTRAVENOUS ONCE
Refills: 0 | Status: DISCONTINUED | OUTPATIENT
Start: 2023-07-24 | End: 2023-07-24

## 2023-07-24 RX ADMIN — SODIUM CHLORIDE 1000 MILLILITER(S): 9 INJECTION, SOLUTION INTRAVENOUS at 06:33

## 2023-07-24 RX ADMIN — Medication 15 MILLIGRAM(S): at 06:05

## 2023-07-24 RX ADMIN — Medication 10 MILLIGRAM(S): at 06:33

## 2023-07-24 RX ADMIN — Medication 15 MILLIGRAM(S): at 06:40

## 2023-07-24 NOTE — ED PROVIDER NOTE - CLINICAL SUMMARY MEDICAL DECISION MAKING FREE TEXT BOX
37 yo woman w/ no pmhx here w/ severe HA starting around 5 pm and gradually worsening and onset of abdominal pain w/ N/V this morning. Nb/NB vomit. no hx of similar in the past. no trauma. + abdo cramping  states HA is sharp, started left sided now genearlized  no neck pain  no pain w/ lights but discomfort    hx of cholecystectomy and   LMP 3 weeks ago  no urinary symptoms    wd/wn  + pain distress, very uncomfortable  rrr s1s2 wnl  cta b/l  nt/nd + Bs  aao X 3  from X 4  srength 5/5 x 4  gait stable    37 yo woman w/ severe HA and vomiting/nausea. No hx of migraines or similar HA in past  analgesia, antiemetics, labs  NCHCT and reassess 37 yo woman w/ no pmhx here w/ severe HA starting around 5 pm and gradually worsening and onset of abdominal pain w/ N/V this morning. Nb/NB vomit. no hx of similar in the past. no trauma. + abdo cramping  states HA is sharp, started left sided now genearlized  no neck pain  no pain w/ lights but discomfort    hx of cholecystectomy and   LMP 3 weeks ago  no urinary symptoms    wd/wn  + pain distress, very uncomfortable  rrr s1s2 wnl  cta b/l  nt/nd + Bs  aao X 3  from X 4  srength 55 x 4  gait stable    37 yo woman w/ severe HA and vomiting/nausea. No hx of migraines or similar HA in past  analgesia, antiemetics, labs  NCHCT and reassess    NCHCT negative. Pt improved. Given time course and neative NCHCT, pt very low risk SAH. Will have pt f/u w/ neuro as outpatient.

## 2023-07-24 NOTE — ED PROVIDER NOTE - OBJECTIVE STATEMENT
38-year-old female with no PMH, PSH of cholecystectomy, C-sections, presents ED for evaluation of headache since 5 PM yesterday.  Patient reports sudden onset severe left-sided headache that is sharp, constant, without modifying or relieving factors.  Denies photophobia, denies prior history of migraines or similar headaches in the past.  + Nausea and multiple episodes of nonbloody nonbilious emesis over this time.  States she was able to go to bed around midnight and woke up at 2 AM with continued headache but this time had upper abdominal pain that she states is sharp, intermittent lasting a few minutes and resolving on own, 6 out of 10 in severity currently but 10/10 at its worst.  Denies modifying or relieving factors.  Denies recent fever/chills, neck pain, CP, SOB, diarrhea, black or bloody stool, vaginal bleeding or discharge, urinary symptoms, extremity numbness/weakness/paresthesias, swelling.  LMP 3 weeks ago, is sexually active.

## 2023-07-24 NOTE — ED PROVIDER NOTE - PATIENT PORTAL LINK FT
You can access the FollowMyHealth Patient Portal offered by [facility] by registering at the following website: [email or web address]. By joining [employer]’s FollowMyHealth portal, you will also be able to view your health information using other applications (apps) compatible with our system.

## 2023-07-24 NOTE — ED PROVIDER NOTE - PHYSICAL EXAMINATION
VITAL SIGNS: I have reviewed nursing notes and confirm.  CONSTITUTIONAL: Uncomfortable appearing female in no acute distress  SKIN: Skin exam is warm and dry, no acute rash.  HEAD: Normocephalic; atraumatic.  EYES: PERRL, EOM intact; conjunctiva and sclera clear.  ENT: No nasal discharge; airway clear. TMs clear.  NECK: Supple; non tender.  CARD: S1, S2 normal; no murmurs, gallops, or rubs. Regular rate and rhythm.  RESP: Normal respiratory effort, no tachypnea or distress. Lungs CTAB, no wheezes, rales or rhonchi.  ABD: soft, NT/ND.  EXT: Normal ROM. No clubbing, cyanosis or edema.  Neuro: A&Ox3, normal speech, CN II-XII intact, FROM & strength 5/5 x4 extremities. Sensation intact and equal. Normal gait, (-)romberg, no pronator drift, no dysmetria on finger to nose b/l.   PSYCH: Cooperative, appropriate.

## 2023-07-24 NOTE — ED PROVIDER NOTE - ATTENDING CONTRIBUTION TO CARE
I have personally seen and examined this patient. I have fully participated in the care of this patient. I have made amendments to the documentation where appropriate and otherwise agree with the history, physical exam, and plan as documented by the Resident. I have personally seen and examined this patient. I have fully participated in the care of this patient. I have made amendments to the documentation where appropriate and otherwise agree with the history, physical exam, and plan as documented by the Resident.    39 yo woman w/ no pmhx here w/ severe HA starting around 5 pm and gradually worsening and onset of abdominal pain w/ N/V this morning. Nb/NB vomit. no hx of similar in the past. no trauma. + abdo cramping  states HA is sharp, started left sided now genearlized  no neck pain  no pain w/ lights but discomfort    hx of cholecystectomy and   LMP 3 weeks ago  no urinary symptoms    wd/wn  + pain distress, very uncomfortable  rrr s1s2 wnl  cta b/l  nt/nd + Bs  aao X 3  from X 4  srength 5/5 x 4  gait stable    39 yo woman w/ severe HA and vomiting/nausea. No hx of migraines or similar HA in past  analgesia, antiemetics, labs  NCHCT and reassess    0700: endorsed to Dr. Becerra awaiting imaging results

## 2023-07-24 NOTE — ED ADULT NURSE NOTE - NSFALLUNIVINTERV_ED_ALL_ED
Bed/Stretcher in lowest position, wheels locked, appropriate side rails in place/Call bell, personal items and telephone in reach/[employer] to call system/Physically safe environment - no spills, clutter or unnecessary equipment/Purposeful proactive rounding/Room/bathroom lighting operational, light cord in reach

## 2023-11-16 NOTE — PATIENT PROFILE ADULT. - SURGICAL SITE INCISION
HPI    Personal referral by Wife (patient of Dr. 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 Comes in today for cornea evaluation in OD. Patient's mom would like   to know is there is any hope to recover vision in OD.  Pt. Sister reports pt always have a gray discharge in OD.  Pt. Does not complain about pain in the eyes.          Last edited by Latricia Cobb on 11/16/2023  2:14 PM.            Assessment /Plan     For exam results, see Encounter Report.    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 - Stacie    No old records for review, unclear Va potential    Bscan last visit: OD - wnl; OS - phthisis    Patient does seem to flinch when light shined in OD.      Discussed high risk associated with k-pro surgeries including but not limited to glaucoma, infection, dryness, extrusion, melting, CME, RD.      Unable to predict VA potential of OD however would be hesitant to proceed with surgery as pt is comfortable and coping well with low VA OU.  I would be nervous about making Va OD worse or causing a blind painful eye.    Monocular precautions - full time polycarb lenses to be worn at all times.    Light house referral if pt/ family are interested.    [employer] REE                   
no

## 2023-12-06 NOTE — ED ADULT NURSE NOTE - NSWEIGHTCALCTOOLDRUG_GEN_A_CORE
Detail Level: Generalized Detail Level: Detailed Quality 137: Melanoma: Continuity Of Care - Recall System: Patient information entered into a recall system that includes: target date for the next exam specified AND a process to follow up with patients regarding missed or unscheduled appointments When Should The Patient Follow-Up For Their Next Full-Body Skin Exam?: 3 Months Detail Level: Zone Prescription Strength Graduated Compression Stockings Recommendations: The patient was counseled that prescription strength graduated compression stockings should be worn for all waking hours. They will follow up with a venous specialist to monitor graduated compression stocking usage and their symptoms.  used

## 2024-02-01 NOTE — ED ADULT NURSE NOTE - IN THE PAST 12 MONTHS HAVE YOU USED DRUGS OTHER THAN THOSE REQUIRED FOR MEDICAL REASON?
Encourage use of cool mist humidifier at home. Continue to use saline nasal spray to help with the post nasal drip that can contribute to your sore throat.     Recommend warm tea with honey, throat lozenges or gargling with a little salt water to help with throat pain as well.     Please return if having recurrent high fevers temp >101, increasing throat pain, inability to tolerate PO, or symptoms just continuing to not get any better.    No

## 2024-10-20 ENCOUNTER — EMERGENCY (EMERGENCY)
Facility: HOSPITAL | Age: 39
LOS: 0 days | Discharge: HOME | End: 2024-10-20
Attending: STUDENT IN AN ORGANIZED HEALTH CARE EDUCATION/TRAINING PROGRAM
Payer: COMMERCIAL

## 2024-10-20 VITALS
DIASTOLIC BLOOD PRESSURE: 75 MMHG | TEMPERATURE: 98 F | RESPIRATION RATE: 18 BRPM | HEART RATE: 72 BPM | HEIGHT: 64 IN | OXYGEN SATURATION: 100 % | WEIGHT: 154.1 LBS | SYSTOLIC BLOOD PRESSURE: 107 MMHG

## 2024-10-20 DIAGNOSIS — R10.30 LOWER ABDOMINAL PAIN, UNSPECIFIED: ICD-10-CM

## 2024-10-20 DIAGNOSIS — R11.2 NAUSEA WITH VOMITING, UNSPECIFIED: ICD-10-CM

## 2024-10-20 DIAGNOSIS — R10.32 LEFT LOWER QUADRANT PAIN: ICD-10-CM

## 2024-10-20 DIAGNOSIS — N83.202 UNSPECIFIED OVARIAN CYST, LEFT SIDE: ICD-10-CM

## 2024-10-20 DIAGNOSIS — Z98.891 HISTORY OF UTERINE SCAR FROM PREVIOUS SURGERY: Chronic | ICD-10-CM

## 2024-10-20 LAB
ALBUMIN SERPL ELPH-MCNC: 4.2 G/DL — SIGNIFICANT CHANGE UP (ref 3.5–5.2)
ALP SERPL-CCNC: 76 U/L — SIGNIFICANT CHANGE UP (ref 30–115)
ALT FLD-CCNC: 8 U/L — SIGNIFICANT CHANGE UP (ref 0–41)
ANION GAP SERPL CALC-SCNC: 8 MMOL/L — SIGNIFICANT CHANGE UP (ref 7–14)
APPEARANCE UR: CLEAR — SIGNIFICANT CHANGE UP
AST SERPL-CCNC: 15 U/L — SIGNIFICANT CHANGE UP (ref 0–41)
BACTERIA # UR AUTO: ABNORMAL /HPF
BASOPHILS # BLD AUTO: 0.05 K/UL — SIGNIFICANT CHANGE UP (ref 0–0.2)
BASOPHILS NFR BLD AUTO: 0.5 % — SIGNIFICANT CHANGE UP (ref 0–1)
BILIRUB DIRECT SERPL-MCNC: <0.2 MG/DL — SIGNIFICANT CHANGE UP (ref 0–0.3)
BILIRUB INDIRECT FLD-MCNC: >0.1 MG/DL — LOW (ref 0.2–1.2)
BILIRUB SERPL-MCNC: 0.3 MG/DL — SIGNIFICANT CHANGE UP (ref 0.2–1.2)
BILIRUB UR-MCNC: NEGATIVE — SIGNIFICANT CHANGE UP
BUN SERPL-MCNC: 8 MG/DL — LOW (ref 10–20)
CALCIUM SERPL-MCNC: 9.1 MG/DL — SIGNIFICANT CHANGE UP (ref 8.4–10.5)
CAST: 1 /LPF — SIGNIFICANT CHANGE UP (ref 0–4)
CHLORIDE SERPL-SCNC: 107 MMOL/L — SIGNIFICANT CHANGE UP (ref 98–110)
CO2 SERPL-SCNC: 24 MMOL/L — SIGNIFICANT CHANGE UP (ref 17–32)
COLOR SPEC: YELLOW — SIGNIFICANT CHANGE UP
CREAT SERPL-MCNC: 0.6 MG/DL — LOW (ref 0.7–1.5)
DIFF PNL FLD: NEGATIVE — SIGNIFICANT CHANGE UP
EGFR: 118 ML/MIN/1.73M2 — SIGNIFICANT CHANGE UP
EOSINOPHIL # BLD AUTO: 0.16 K/UL — SIGNIFICANT CHANGE UP (ref 0–0.7)
EOSINOPHIL NFR BLD AUTO: 1.7 % — SIGNIFICANT CHANGE UP (ref 0–8)
GLUCOSE SERPL-MCNC: 94 MG/DL — SIGNIFICANT CHANGE UP (ref 70–99)
GLUCOSE UR QL: NEGATIVE MG/DL — SIGNIFICANT CHANGE UP
HCT VFR BLD CALC: 39.7 % — SIGNIFICANT CHANGE UP (ref 37–47)
HGB BLD-MCNC: 13.2 G/DL — SIGNIFICANT CHANGE UP (ref 12–16)
IMM GRANULOCYTES NFR BLD AUTO: 0.3 % — SIGNIFICANT CHANGE UP (ref 0.1–0.3)
KETONES UR-MCNC: NEGATIVE MG/DL — SIGNIFICANT CHANGE UP
LACTATE SERPL-SCNC: 1.7 MMOL/L — SIGNIFICANT CHANGE UP (ref 0.7–2)
LEUKOCYTE ESTERASE UR-ACNC: ABNORMAL
LIDOCAIN IGE QN: 43 U/L — SIGNIFICANT CHANGE UP (ref 7–60)
LYMPHOCYTES # BLD AUTO: 2.6 K/UL — SIGNIFICANT CHANGE UP (ref 1.2–3.4)
LYMPHOCYTES # BLD AUTO: 27.5 % — SIGNIFICANT CHANGE UP (ref 20.5–51.1)
MCHC RBC-ENTMCNC: 28.7 PG — SIGNIFICANT CHANGE UP (ref 27–31)
MCHC RBC-ENTMCNC: 33.2 G/DL — SIGNIFICANT CHANGE UP (ref 32–37)
MCV RBC AUTO: 86.3 FL — SIGNIFICANT CHANGE UP (ref 81–99)
MONOCYTES # BLD AUTO: 0.4 K/UL — SIGNIFICANT CHANGE UP (ref 0.1–0.6)
MONOCYTES NFR BLD AUTO: 4.2 % — SIGNIFICANT CHANGE UP (ref 1.7–9.3)
NEUTROPHILS # BLD AUTO: 6.22 K/UL — SIGNIFICANT CHANGE UP (ref 1.4–6.5)
NEUTROPHILS NFR BLD AUTO: 65.8 % — SIGNIFICANT CHANGE UP (ref 42.2–75.2)
NITRITE UR-MCNC: NEGATIVE — SIGNIFICANT CHANGE UP
NRBC # BLD: 0 /100 WBCS — SIGNIFICANT CHANGE UP (ref 0–0)
PH UR: 6 — SIGNIFICANT CHANGE UP (ref 5–8)
PLATELET # BLD AUTO: 313 K/UL — SIGNIFICANT CHANGE UP (ref 130–400)
PMV BLD: 10.6 FL — HIGH (ref 7.4–10.4)
POTASSIUM SERPL-MCNC: 4.6 MMOL/L — SIGNIFICANT CHANGE UP (ref 3.5–5)
POTASSIUM SERPL-SCNC: 4.6 MMOL/L — SIGNIFICANT CHANGE UP (ref 3.5–5)
PROT SERPL-MCNC: 7.5 G/DL — SIGNIFICANT CHANGE UP (ref 6–8)
PROT UR-MCNC: NEGATIVE MG/DL — SIGNIFICANT CHANGE UP
RBC # BLD: 4.6 M/UL — SIGNIFICANT CHANGE UP (ref 4.2–5.4)
RBC # FLD: 12.5 % — SIGNIFICANT CHANGE UP (ref 11.5–14.5)
RBC CASTS # UR COMP ASSIST: 9 /HPF — HIGH (ref 0–4)
SODIUM SERPL-SCNC: 139 MMOL/L — SIGNIFICANT CHANGE UP (ref 135–146)
SP GR SPEC: 1.02 — SIGNIFICANT CHANGE UP (ref 1–1.03)
SQUAMOUS # UR AUTO: 7 /HPF — HIGH (ref 0–5)
UROBILINOGEN FLD QL: 0.2 MG/DL — SIGNIFICANT CHANGE UP (ref 0.2–1)
WBC # BLD: 9.46 K/UL — SIGNIFICANT CHANGE UP (ref 4.8–10.8)
WBC # FLD AUTO: 9.46 K/UL — SIGNIFICANT CHANGE UP (ref 4.8–10.8)
WBC UR QL: 2 /HPF — SIGNIFICANT CHANGE UP (ref 0–5)

## 2024-10-20 PROCEDURE — 96374 THER/PROPH/DIAG INJ IV PUSH: CPT | Mod: XU

## 2024-10-20 PROCEDURE — 36415 COLL VENOUS BLD VENIPUNCTURE: CPT

## 2024-10-20 PROCEDURE — 80076 HEPATIC FUNCTION PANEL: CPT

## 2024-10-20 PROCEDURE — 81025 URINE PREGNANCY TEST: CPT

## 2024-10-20 PROCEDURE — 74177 CT ABD & PELVIS W/CONTRAST: CPT | Mod: MC

## 2024-10-20 PROCEDURE — 81001 URINALYSIS AUTO W/SCOPE: CPT

## 2024-10-20 PROCEDURE — 83690 ASSAY OF LIPASE: CPT

## 2024-10-20 PROCEDURE — 96375 TX/PRO/DX INJ NEW DRUG ADDON: CPT

## 2024-10-20 PROCEDURE — 80048 BASIC METABOLIC PNL TOTAL CA: CPT

## 2024-10-20 PROCEDURE — 85025 COMPLETE CBC W/AUTO DIFF WBC: CPT

## 2024-10-20 PROCEDURE — 99285 EMERGENCY DEPT VISIT HI MDM: CPT

## 2024-10-20 PROCEDURE — 74177 CT ABD & PELVIS W/CONTRAST: CPT | Mod: 26,MC

## 2024-10-20 PROCEDURE — 99284 EMERGENCY DEPT VISIT MOD MDM: CPT | Mod: 25

## 2024-10-20 PROCEDURE — 83605 ASSAY OF LACTIC ACID: CPT

## 2024-10-20 RX ORDER — SODIUM CHLORIDE 0.9 % (FLUSH) 0.9 %
1000 SYRINGE (ML) INJECTION ONCE
Refills: 0 | Status: COMPLETED | OUTPATIENT
Start: 2024-10-20 | End: 2024-10-20

## 2024-10-20 RX ORDER — ONDANSETRON HCL/PF 4 MG/2 ML
4 VIAL (ML) INJECTION ONCE
Refills: 0 | Status: COMPLETED | OUTPATIENT
Start: 2024-10-20 | End: 2024-10-20

## 2024-10-20 RX ORDER — CEFDINIR 250 MG/5ML
1 POWDER, FOR SUSPENSION ORAL
Qty: 14 | Refills: 0
Start: 2024-10-20 | End: 2024-10-26

## 2024-10-20 RX ORDER — FAMOTIDINE 40 MG
20 TABLET ORAL ONCE
Refills: 0 | Status: COMPLETED | OUTPATIENT
Start: 2024-10-20 | End: 2024-10-20

## 2024-10-20 RX ADMIN — Medication 1000 MILLILITER(S): at 08:14

## 2024-10-20 RX ADMIN — Medication 4 MILLIGRAM(S): at 08:13

## 2024-10-20 RX ADMIN — Medication 20 MILLIGRAM(S): at 08:14

## 2024-10-20 NOTE — ED PROVIDER NOTE - NSFOLLOWUPINSTRUCTIONS_ED_ALL_ED_FT
Abdominal Pain    Many things can cause abdominal pain. Many times, abdominal pain is not caused by a disease and will improve without treatment. Your health care provider will do a physical exam to determine if there is a dangerous cause of your pain; blood tests and imaging may help determine the cause of your pain. However, in many cases, no cause may be found and you may need further testing as an outpatient. Monitor your abdominal pain for any changes.     SEEK IMMEDIATE MEDICAL CARE IF YOU HAVE ANY OF THE FOLLOWING SYMPTOMS: worsening abdominal pain, uncontrollable vomiting, profuse diarrhea, inability to have bowel movements or pass gas, black or bloody stools, fever accompanying chest pain or back pain, or fainting. These symptoms may represent a serious problem that is an emergency. Do not wait to see if the symptoms will go away. Get medical help right away. Call 911 and do not drive yourself to the hospital.    Ovarian Cyst    WHAT YOU NEED TO KNOW:    An ovarian cyst is a sac that grows on an ovary. This sac usually contains fluid, but may sometimes have blood or tissue in it. Most ovarian cysts are harmless and go away without treatment in a few months. Some cysts can grow large, cause pain, or break open.     DISCHARGE INSTRUCTIONS:    Call 911 for any of the following:     You are too weak or dizzy to stand up.        Return to the emergency department if:     You have severe abdominal pain. The pain may be sharp and sudden.      You have a fever.    Contact your healthcare provider if:     Your periods are early, late, or more painful than usual.      You have bleeding from your vagina that is not your period.      You have abdominal pain all the time.      Your abdomen is swollen.      You have feelings of fullness, pressure, or discomfort in your abdomen.      You have trouble urinating or emptying your bladder completely.      You have pain during sex.      You are losing weight without trying.      You have questions or concerns about your condition or care.    Medicines: You may need any of the following:     NSAIDs, such as ibuprofen, help decrease swelling, pain, and fever. This medicine is available with or without a doctor's order. NSAIDs can cause stomach bleeding or kidney problems in certain people. If you take blood thinner medicine, always ask if NSAIDs are safe for you. Always read the medicine label and follow directions. Do not give these medicines to children under 6 months of age without direction from your child's healthcare provider.      Birth control pills may help to control your periods, prevent cysts, or cause them to shrink.      Take your medicine as directed. Contact your healthcare provider if you think your medicine is not helping or if you have side effects. Tell him or her if you are allergic to any medicine. Keep a list of the medicines, vitamins, and herbs you take. Include the amounts, and when and why you take them. Bring the list or the pill bottles to follow-up visits. Carry your medicine list with you in case of an emergency.    Follow up with your healthcare provider as directed: Write down your questions so you remember to ask them during your visits.     Apply heat to decrease pain and cramping: Sit in a warm bath, or place a heating pad (turned on low) or a hot water bottle on your abdomen. Do this for 15 to 20 minutes every hour for as many days as directed.

## 2024-10-20 NOTE — ED PROVIDER NOTE - OBJECTIVE STATEMENT
39-year-old female status post cholecystectomy presents to the ED complaining of lower abdominal pain with nausea and vomiting x 2 that started this morning.  Patient denies any fever, chills, urinary symptoms, diarrhea, chest pain or shortness of breath.

## 2024-10-20 NOTE — ED PROVIDER NOTE - CLINICAL SUMMARY MEDICAL DECISION MAKING FREE TEXT BOX
.    39-year-old female, PMH as noted, presents with lower abdominal pain x 2 days.  + Nausea vomiting.  No fever, diarrhea, chest pain, shortness of breath, cough or urinary complaints.    Differential diagnosis includes but not limited to UTI, pyelonephritis, renal stone, colitis, hepatobiliary, pancreatitis.  Exam as noted above    All available lab tests, imaging tests, and EKGs independently reviewed and interpreted by [facility] ifsh.  Normal LFT and lipase.  UA negative.  CT imaging consistent with enteritis, + 2.5 cm left adnexal cyst.    Impression abdominal pain.  Patient for better after medication in the emergency department.  All results were shared with patient.  Patient understand results, plan of care, supportive care, outpatient follow-up, and return precautions.  Patient is comfortable discharge.  DC home.      .

## 2024-10-20 NOTE — ED ADULT NURSE NOTE - NSFALLRISKFACTORS_ED_ALL_ED
No Quality 431: Preventive Care And Screening: Unhealthy Alcohol Use - Screening: Patient not identified as an unhealthy alcohol user when screened for unhealthy alcohol use using a systematic screening method Detail Level: Detailed Quality 110: Preventive Care And Screening: Influenza Immunization: Influenza Immunization previously received during influenza season Quality 226: Preventive Care And Screening: Tobacco Use: Screening And Cessation Intervention: Patient screened for tobacco use and is an ex/non-smoker Quality 130: Documentation Of Current Medications In The Medical Record: Current Medications Documented No indicators present

## 2025-01-08 NOTE — ED PROVIDER NOTE - NSFOLLOWUPCLINICSTOKEN_GEN_ALL_ED_FT
Current patient location: [address] [facility] [address]    Is the patient currently in the state of [place]? YES    Visit mode:VIDEO    If the visit is dropped, the patient can be reconnected by: VIDEO VISIT: Text to cell phone:   Telephone Information:   Mobile [phone]       Will anyone else be joining the visit? NO  (If patient encounters technical issues they should call [phone]    Are changes needed to the allergy or medication list? Pt stated no changes to allergies and Pt stated no med changes    Are refills needed on medications prescribed by this physician? NO    Reason for visit: Medication Therapy Management    Bárbara Alcocer VVF   [id number]: || ||00\01||False;

## 2025-02-23 NOTE — ED ADULT NURSE NOTE - NS ED NURSE LEVEL OF CONSCIOUSNESS SPEECH
CHIEF COMPLAINT  Atrial Fibrillation     HISTORY OF PRESENT ILLNESS:  Aleena Armstrong is a 77 y.o. female who presents with fatigue, palpitations shortness of breath for the past week.  States that she is in atrial fibrillation.  She has a history of paroxysmal A-fib and has been cardioverted 3 times.  She was cardioverted in the emergency department in the middle of January.  States that she has a watchman in place and no longer takes anticoagulation or rate controlling medications.  States that she thinks that she has been in and out of A-fib this past week.  Cardiologist Dr. Barahona she has an appointment on 2 March.  She takes flecainide and try taking extra dose without improvement.      PAST MEDICAL HISTORY  Past Medical History:   Diagnosis Date    Travis     HAD ABLATION FOR THIS IN 2017    Anemia     Anxiety and depression 12/11/2016    GERD (gastroesophageal reflux disease)     Glaucoma of left eye 12/11/2016    H/O migraine     LAST 3  YEARS AGO     History of MRSA infection     APPROX 8 YEARS, TREATED WITH ORAL ABX, NEGATIVE CULTURES FOLLOWING TX     History of transfusion 12/2023    [facility], GI Bleed, 4 units, no reactions    Hyperlipidemia     Hypertension     MEDS DC'D BY DR GOMEZ 1/2017    Insomnia 12/11/2016    Kidney disease     Peptic ulcer - s/p gastric bypass surgery (7-8 years ago) 12/11/2016    Rheumatoid arthritis 12/11/2016    Spinal headache     FOLLOWING SPINAL FOR CHILDBIRTH     Vitamin D deficiency 12/11/2016    Wears dentures     UPPER PLATE     Elodia-Parkinson-White (WPW) syndrome     1990s     Reviewed the imported nursing notes    PAST SURGICAL HISTORY  Past Surgical History:   Procedure Laterality Date    ABLATION OF DYSRHYTHMIC FOCUS  1996    ATRIAL APPENDAGE EXCLUSION LEFT WITH TRANSESOPHAGEAL ECHOCARDIOGRAM N/A 05/22/2024    Procedure: Atrial Appendage Occlusion;  Surgeon: Jesse Keen DO;  Location: [facility] INVASIVE LOCATION;  Service: Cardiology;  Laterality: N/A;    BREAST  BIOPSY  ~1990    CARDIAC CATHETERIZATION N/A 10/24/2016    Procedure: Left Heart Cath;  Surgeon: Rubens Morejon MD;  Location:  [facility] INVASIVE LOCATION;  Service:     CARDIAC ELECTROPHYSIOLOGY PROCEDURE N/A 06/20/2017    Procedure: PVA;  Surgeon: Edvin Cook DO;  Location:  [facility] INVASIVE LOCATION;  Service:     CHOLECYSTECTOMY  ~1990    COLONOSCOPY  2007    ENDOSCOPY N/A 12/18/2023    Procedure: ESOPHAGOGASTRODUODENOSCOPY;  Surgeon: Adeel Reyes MD;  Location:  [facility];  Service: Gastroenterology;  Laterality: N/A;  epi 1mg diluted in 10mls. 1 ml given    GASTRIC BYPASS      GASTRIC BYPASS  2005    HAMMER TOE REPAIR      LEFT--GARO IN PLACE     HYSTERECTOMY  1993    Complete    ORIF HUMERUS FRACTURE Right 02/08/2019    Procedure: ORIF RIGHT PROXIMAL HUMERUS;  Surgeon: Yonatan Galdamez MD;  [facility]:  [id number];  Service: Orthopedics    ORIF HUMERUS FRACTURE Right 11/18/2021    Procedure: HUMERUS PROXIMAL OPEN REDUCTION INTERNAL FIXATION RIGHT WITH HARDWARE REMOVAL AND NAIL PLACEMENT;  Surgeon: Yonatan Galdamez MD;  Location:  [facility];  Service: Orthopedics;  Laterality: Right;    SHOULDER ACROMIOPLASTY Right 2019    WRIST FRACTURE SURGERY Right 2014     Reviewed the imported nursing notes    ALLERGIES  Allergies   Allergen Reactions    Bactrim [Sulfamethoxazole-Trimethoprim] Other (See Comments)     MOUTH SORES    Percocet [Oxycodone-Acetaminophen] Other (See Comments)     NIGHT TERRORS     Statins Myalgia     MYALGIA     Sulfa Antibiotics Other (See Comments)     MOUTH SORES AND RASH     Erythromycin Rash     RASH        MEDICATIONS       Medication List        START taking these medications      metoprolol succinate XL 25 MG 24 hr tablet  Commonly known as: TOPROL-XL  Take 1 tablet by mouth Daily for 30 days.  Start taking on: February 24, 2025            CONTINUE taking these medications      amitriptyline 25 MG tablet  Commonly known as: ELAVIL     amLODIPine 5 MG tablet  Commonly  known as: NORVASC  Take 1 tablet by mouth Daily.     aspirin 81 MG EC tablet  Take 1 tablet by mouth Daily. Start on the morning of Watchman procedure     B-1 PO     flecainide 50 MG tablet  Commonly known as: TAMBOCOR  Take 1 tablet by mouth Every 12 (Twelve) Hours.     FLUoxetine 40 MG capsule  Commonly known as: PROzac     folic acid 1 MG tablet  Commonly known as: FOLVITE     IRON CR PO     methotrexate 2.5 MG tablet     omeprazole 20 MG capsule  Commonly known as: priLOSEC     ondansetron ODT 4 MG disintegrating tablet  Commonly known as: ZOFRAN-ODT     pantoprazole 40 MG EC tablet  Commonly known as: PROTONIX  Take 1 tablet by mouth 2 (Two) Times a Day Before Meals.     pregabalin 50 MG capsule  Commonly known as: LYRICA     Prolia 60 MG/ML solution prefilled syringe syringe  Generic drug: denosumab     promethazine 25 MG tablet  Commonly known as: PHENERGAN  Take 0.5 tablets by mouth Every 6 (Six) Hours As Needed for Nausea or Vomiting.     timolol 0.5 % ophthalmic solution  Commonly known as: TIMOPTIC     traMADol 50 MG tablet  Commonly known as: ULTRAM  Take 1 tablet by mouth Every 6 (Six) Hours As Needed for pain.     traZODone 100 MG tablet  Commonly known as: DESYREL     vitamin D 1.25 MG (60744 UT) capsule capsule  Commonly known as: ERGOCALCIFEROL     vitamin D3 125 MCG (5000 UT) tablet tablet               Where to Get Your Medications        These medications were sent to [facility] [id number] - [place], [place] - [address] [place] [address] [phone]  - [phone] [place] [address]      Phone: [phone]   metoprolol succinate XL 25 MG 24 hr tablet       SOCIAL HISTORY    Social History     Tobacco Use    Smoking status: Former     Current packs/day: 0.00     Average packs/day: 1 pack/day for 30.0 years (30.0 ttl pk-yrs)     Types: Cigarettes     Quit date:      Years since quittin.1    Smokeless tobacco: Never   Vaping Use    Vaping status:  "Never Used   Substance Use Topics    Alcohol use: Yes     Alcohol/week: 1.0 standard drink of alcohol     Types: 1 Glasses of wine per week     Comment: occational    Drug use: No     The patient otherwise denies any further social history.  Reviewed the imported nursing notes      FAMILY HISTORY  Family History   Problem Relation Age of Onset    Arrhythmia Mother     Hypertension Mother     Stroke Mother     Diabetes Mother     Lung cancer Father     Breast cancer Sister     Breast cancer Maternal Aunt     Diabetes Son         Type 1    Breast cancer Cousin     Ovarian cancer Cousin      The patient otherwise patient denies any further family history.  Reviewed the imported nursing notes      REVIEW OF SYSTEMS  Reviewed and negative/not pertinent unless mentioned in the HPI        PHYSICAL EXAM  Vitals: /86   Pulse 80   Temp 99.3 °F (37.4 °C) (Oral)   Resp 18   Ht 170.2 cm (67\")   Wt 66.7 kg (147 lb 1.6 oz)   LMP  (LMP Unknown)   SpO2 93%   BMI 23.04 kg/m²      General Appearance: Awake and Alert, cooperative, no distress   Head:  Normocephalic, atraumatic   Eyes: Conjunctiva clear, corneas clear   Ears:  Normal external ears,   Nose: Nares normal and clear   Throat: Lips, mucosa moist   Neck:  Trachea midline, no stridor   Lungs:  Clear to auscultation bilaterally, respirations unlabored   Heart: Irregular regular rate rhythm no murmur rub or gallop   Abdomen: Nondistended, non tender   Genitourinary:  Pelvic:  Rectal: Not Performed  Not Performed  Not Performed   Extremities: Extremities Atraumatic   Vascular: Present in all extremities   Skin:  no rashes or lesions   Neurologic: Non Focal , CN 2-12 grossly intact, GCS 15   Psyc: Not Performed         MEDICAL DECISION MAKING - ED COURSE/PROCEDURE/DDX:    PULSE OX: Interpretation by me on room air Is normal  SpO2 Readings from Last 1 Encounters:   02/23/25 93%          CARDIAC MONITOR: Sinus rhythm  Pulse Readings from Last 1 Encounters:   02/23/25 " 80            I reviewed the nursing notes: YES  I reviewed and discussed with EMS:   External documents reviewed:   Additional history taken from: Previous notes         Adult Transesophageal Echocardiogram w/Complete Doppler, Color Flow & 3D Rendering, Reconstruction & Interpretation Under Concurrent Supervision    Accession Number: [id number]   Date of Study: 6/26/24   Ordering Provider: Jesse Keen DO   Clinical Indications: Arrhythmia, AFib        Reading Physicians  Performing Staff   Cardiology: Ibrahima Lorenzo MD    Tech: Saumya Jessica, [facility], [facility]   Support Staff: Omar Mckeon RN        Patient Hx Of Height, Weight, and Vitals    Height Weight BSA (Calculated - sq m) BMI (Calculated) Retired BMI (kg/m2) Pulse BP        65 126/75      [facility]     Show images for Adult Transesophageal Echo 3D (DEEPAK) W/ Cont If Necessary Per Protocol   Clinical Indication    Arrhythmia; AFib   Dx: Paroxysmal atrial fibrillation [I48.0 (ICD-10-CM)]; Presence of Watchman left atrial appendage closure device [Z95.818 (ICD-10-CM)]   Comments: 45-day DEEPAK. S/p watchman placement by Dr. Keen 5/22/2024.     Interpretation Summary         Left ventricular systolic function is normal. Left ventricular ejection fraction appears to be 56 - 60%.    The left atrial cavity is dilated.    There is a well positioned Watchman FLX left atrial appendage occlusion device. There is a moderate marylin-prosthetic leak present with a maximal diameter measured at 2.8 mm.    Mild mitral valve regurgitation is present.    Mild to moderate tricuspid valve regurgitation is present.    There is mild plaque in the aortic arch present.             Discussed with consulting physician cardiology additionally, the admitting / accepting provider was contacted with handoff      LAB REVIEWED: Interpretation of all unique test ordered  Labs Reviewed   COMPREHENSIVE METABOLIC PANEL - Abnormal; Notable for the following components:       Result Value     Glucose 108 (*)     All other components within normal limits    Narrative:     GFR Categories in Chronic Kidney Disease (CKD)      GFR Category          GFR (mL/min/1.73)    Interpretation  G1                     90 or greater         Normal or high (1)  G2                      60-89                Mild decrease (1)  G3a                   45-59                Mild to moderate decrease  G3b                   30-44                Moderate to severe decrease  G4                    15-29                Severe decrease  G5                    14 or less           Kidney failure          (1)In the absence of evidence of kidney disease, neither GFR category G1 or G2 fulfill the criteria for CKD.    eGFR calculation 2021 CKD-EPI creatinine equation, which does not include race as a factor   BNP (IN-HOUSE) - Abnormal; Notable for the following components:    proBNP 8,312.0 (*)     All other components within normal limits    Narrative:     This assay is used as an aid in the diagnosis of individuals suspected of having heart failure. It can be used as an aid in the diagnosis of acute decompensated heart failure (ADHF) in patients presenting with signs and symptoms of ADHF to the emergency department (ED). In addition, NT-proBNP of <300 pg/mL indicates ADHF is not likely.    Age Range Result Interpretation  NT-proBNP Concentration (pg/mL:      <50             Positive            >450                   Gray                 300-450                    Negative             <300    50-75           Positive            >900                  Gray                300-900                  Negative            <300      >75             Positive            >1800                  Gray                300-1800                  Negative            <300   CBC WITH AUTO DIFFERENTIAL - Abnormal; Notable for the following components:    RBC 3.26 (*)     Hemoglobin 10.1 (*)     Hematocrit 32.9 (*)     .9 (*)     MCHC 30.7 (*)      Speaking Coherently Lymphocyte % 15.2 (*)     Eosinophil % 0.2 (*)     All other components within normal limits   COVID-19/FLUA&B/RSV, NP SWAB IN TRANSPORT MEDIA 1 HR TAT - Normal    Narrative:     Fact sheet for providers: https://www.fda.gov/media/490499/download    Fact sheet for patients: https://www.fda.gov/media/521621/download    Test performed by PCR.   MAGNESIUM - Normal   TSH RFX ON ABNORMAL TO FREE T4 - Normal   RAINBOW DRAW    Narrative:     The following orders were created for panel order [place] Draw.  Procedure                               Abnormality         Status                     ---------                               -----------         ------                     Green Top (Gel)[[id number]]                                  Final result               Lavender Top[[id number]]                                     Final result               Gold Top - SST[[id number]]                                   Final result               Jacobo Top[[id number]]                                         Final result               Light Blue Top[[id number]]                                   Final result                 Please view results for these tests on the individual orders.   CBC AND DIFFERENTIAL    Narrative:     The following orders were created for panel order CBC & Differential.  Procedure                               Abnormality         Status                     ---------                               -----------         ------                     CBC Auto Differential[[id number]]        Abnormal            Final result                 Please view results for these tests on the individual orders.   GREEN TOP   LAVENDER TOP   GOLD TOP - SST   GRAY TOP   LIGHT BLUE TOP       IMAGING REVIEWED  My X-ray interpretation: No pneumonia  My CT interpretation:   My Ultrasound interpretation:     XR Chest 1 View   Final Result   Mild chronic interstitial changes of the lung fields without evidence of acute cardiopulmonary abnormality.       Electronically Signed: Rene Mendoza MD     2/23/2025 2:15 PM EST     Workstation ID: [id number]          Procedures:    Electrical Cardioversion    Date/Time: 2/23/2025 2:52 PM    Performed by: Александр Mcguire PA-C  Authorized by: Allan Posadas MD    Consent:     Consent obtained:  Written and verbal    Consent given by:  Patient    Risks, benefits, and alternatives were discussed: yes      Risks discussed:  Cutaneous burn, death, induced arrhythmia and pain    Alternatives discussed:  No treatment, rate-control medication, alternative treatment, referral, delayed treatment and observation  Universal protocol:     Procedure explained and questions answered to patient or proxy's satisfaction: yes      Relevant documents present and verified: yes      Test results available: yes      Imaging studies available: yes      Required blood products, implants, devices, and special equipment available: no      Site/side marked: no      Immediately prior to procedure, a time out was called: yes      Patient identity confirmed:  Verbally with patient, arm band and hospital-assigned identification number  Pre-procedure details:     Cardioversion basis:  Emergent    Rhythm:  Atrial fibrillation    Electrode placement:  Anterior-posterior  Attempt one:     Cardioversion mode:  Synchronous    Shock (Joules):  200    Shock outcome:  Conversion to normal sinus rhythm  Post-procedure details:     Patient status:  Alert    Procedure completion:  Tolerated  Procedural Sedation    Date/Time: 2/23/2025 2:53 PM    Performed by: Allan Posadas MD  Authorized by: Allan Posadas MD    Consent:     Consent obtained:  Verbal and written    Consent given by:  Patient    Risks, benefits, and alternatives were discussed: yes      Risks discussed:  Allergic reaction, dysrhythmia, prolonged sedation necessitating reversal, prolonged hypoxia resulting in organ damage, respiratory compromise necessitating ventilatory assistance and  intubation, inadequate sedation, nausea and vomiting    Alternatives discussed:  Analgesia without sedation and anxiolysis  Universal protocol:     Procedure explained and questions answered to patient or proxy's satisfaction: yes      Relevant documents present and verified: yes      Test results available: yes      Imaging studies available: yes      Required blood products, implants, devices, and special equipment available: no      Site/side marked: no      Immediately prior to procedure, a time out was called: yes      Patient identity confirmed:  Verbally with patient, arm band and hospital-assigned identification number  Indications:     Procedure performed:  Cardioversion    Procedure necessitating sedation performed by:  Physician performing sedation    Intended level of sedation:  Moderate  Pre-sedation assessment:     Time since last food or drink:  23 hours    ASA classification: class 2 - patient with mild systemic disease      Mouth opening:  3 or more finger widths    Thyromental distance:  3 finger widths    Mallampati score:  II - soft palate, uvula, fauces visible    Neck mobility: normal      Pre-sedation assessments completed and reviewed: airway patency, anesthesia/sedation history, cardiovascular function, hydration status, mental status, nausea/vomiting, pain level, respiratory function and temperature      History of difficult intubation: no      Pre-sedation assessment completed:  2/23/2025 2:24 PM  Immediate pre-procedure details:     Reassessment: Patient reassessed immediately prior to procedure      Reviewed: vital signs, relevant labs/tests and NPO status      Verified: bag valve mask available, emergency equipment available, intubation equipment available, IV patency confirmed, oxygen available and suction available    Procedure details (see MAR for exact dosages):     Sedation start time:  2/23/2025 2:45 PM    Preoxygenation:  Nasal cannula    Sedation:  Etomidate    Intra-procedure  monitoring:  Blood pressure monitoring, continuous capnometry, frequent LOC assessments, cardiac monitor, continuous pulse oximetry and frequent vital sign checks    Intra-procedure events: none      Sedation end time:  2/23/2025 2:50 PM    Total sedation time (minutes):  10  Post-procedure details:     Post-sedation assessment completed:  2/23/2025 2:57 PM    Attendance: Constant attendance by certified staff until patient recovered      Recovery: Patient returned to pre-procedure baseline      Estimated blood loss (see I/O flowsheets): no      Complications:  None    Post-sedation assessments completed and reviewed: airway patency, cardiovascular function, hydration status, mental status, nausea/vomiting, pain level, respiratory function and temperature      Specimens recovered:  None    Patient is stable for discharge or admission: yes      Procedure completion:  Tolerated            Decision rules/scores:        Drug therapy provided in the ED and monitored as needed given IV/PO :   Medications   sodium chloride 0.9 % bolus 250 mL (0 mL Intravenous Stopped 2/23/25 1500)   etomidate (AMIDATE) injection 10 mg (10 mg Intravenous Given 2/23/25 1445)   metoprolol succinate XL (TOPROL-XL) 24 hr tablet 25 mg (25 mg Oral Given 2/23/25 1608)       Vitals Trend:  Vitals:    02/23/25 1530 02/23/25 1550 02/23/25 1552 02/23/25 1600   BP: 126/81 133/84  126/86   BP Location:       Patient Position:       Pulse: 77 76  80   Resp:       Temp:       TempSrc:       SpO2: 91% 92% 93% 93%   Weight:       Height:           Aleena Armstrong is a 77 y.o. female who presents to the emergency department with the acute illness as stated within HPI  Shared medical decision making regarding a detailed discussion with the patient concerning this ED encounter, the differential diagnosis considered atrial fibrillation rapid ventricular response, electrolyte abnormality, viral illness, heart failure, pneumonia,, and the emergency room imaging  and lab testing done today The patient and/or family have been given an opportunity to ask questions and exhibit an understanding of what happened today in the emergency room.        ED Course as of 02/23/25 1829   Sun Feb 23, 2025   1419 I spoke with Dr. Wolff with cardiology on-call.  He agrees that electrocardioversion seems like a reasonable option for this patient close no contraindications to the workup.  So far it looks like it is reasonable.  Patient was electrocardioverted here in January 19 with successful attempt with 1 attempt at 200 J that she tolerated well.  He did recommend beginning her on a beta-blocker if successful cardioversion. [NC]   1428 Spoke with the patient about recommendation of cardiology and she is agreeable to electrocardioversion.  She states that she is a full code.  She last ate yesterday.  Presedation ASA Mallampati score is a 2. [NC]   1451 Patient tolerated electrocardioversion well.  Post conversion EKG demonstrates sinus rhythm.  Will continue to monitor. [NC]   1549 Patient has recovered extremely well from her moderate sedation.  She is awake alert and oriented.  She is able to ambulate.  She has remained in normal sinus rhythm.  Heart rate remains in the mid 70s with a blood pressure in the 130s over 80s.  Will give her 25 mg of oral metoprolol succinate.  Will have her call her cardiologist as well.  She reports that since her cardioversion, she does have improved symptom relief and is not as fatigued [NC]      ED Course User Index  [NC] Александр Mcguire, LOPEZ           Condition considered emergent due to:  1) Acuity  2) Failure or unavailable treatment in the outpatient setting  3) Risk of deterioration and decompensation given the multiple differential diagnoses that  need to be ruled out      Amount and/or Complexity of Data Reviewed  Clinical lab tests: as above noted in MDM  Tests in the radiology section of CPT®: as above noted in MDM  Tests in the medicine  section of CPT®: as above noted in MDM  Independent visualization of images, tracings, or specimens: as above noted in MDM        CLINICAL IMPRESSION:  Final diagnoses:   Atrial fibrillation with rapid ventricular response      DISPOSITION:  Discharge      As with my usual standard practice patient was advised to return for any reason for any  complaint at any time whatsoever. Please refer to the discharge instructions, AVS paperwork  and prescriptions written for treatment plan.        Electronically signed by Александр Mcguire PA-C, 02/23/25, 1:54 PM EST.      This report was dictated utilizing a voice recognition system which has a propensity to miss  small words such as a, an, the, no, he,she,him, her,his and not. Please read this report carefully,  and if any discrepancy or uncertainty is present, please contact the author directly for  clarification

## 2025-03-24 ENCOUNTER — APPOINTMENT (OUTPATIENT)
Dept: GASTROENTEROLOGY | Facility: CLINIC | Age: 40
End: 2025-03-24

## 2025-04-22 NOTE — ED ADULT TRIAGE NOTE - AS TEMP SITE
Gastroenterology Clinic Follow Up Note    History Of Present Illness  Cori Adorno is a 61 y.o. female with a past medical history of  duodenal diverticulum presenting for follow up for  bloating, nausea and belching concerning for dyspepsia.    Last seen in clinic 1/2025 to establish care, discussed bloating and nausea nad recommended nutrition referral, continuing low FODMAP diet, and Hyoscyamine 0.375mg BID.     Today patient states she is feeling better and having less nausea after making dietary changes of cutting down on fatty foods and limiting portion sizes. Bloating and dyspepsia symptoms have significantly improved overall. She did meet with a dietitian and tried a low FODMAP diet but said this not help much. She is taking Hyoscyamine once daily which is relieving her symptoms. She only takes Zofran once per month as needed, which has significantly reduced in frequency than before. Having one BM per day, soft and does not need to strain. Weight is stable. Denies any heartburn/regurgitation, dysphagia/odynophagia, diarrhea, constipation, melena, hematochezia, NSAID.      Endoscopic Hx:  Colonoscopy 06/02/2023:    - The examined portion of the ileum was normal. Biopsied. (Negative)  - One non-bleeding diverticula was found in the sigmoid colon.  - The exam of the colon was otherwise normal.  - There is no endoscopic evidence of bleeding, inflammation, mass, polyps, stenosis, ulcerations, colitis or angioectasia in the entire colon. Random biopsies were taken. (Negative)  - Small non-bleeding external hemorrhoids.     EGD 02/06/2023: widely patent Schatzki ring, small hiatal hernia, mild gastritis and non-bleeding, non-inflamed duodenal diverticulum. Biopsies negative for H pylori     EGD 4/21/2022:   Widely patent, non-obstructing and mild Schatzki   ring.  - Gastroesophageal flap valve classified as Hill Grade   II (minimal fold, loose to endoscope, hiatal hernia likely).  -Mild antral gastritis. Biopsied.  (GASTRIC ANTRAL- AND OXYNTIC-TYPE MUCOSA WITH MILD REACTIVE EPITHELIAL CHANGE   AND FOCAL MILD CHRONIC INFLAMMATION), negative for h pylori, biopsies negative for evidence of celiac  - Non-bleeding duodenal diverticulum.  - Normal major papilla.  - Normal examined duodenum.      Past Medical History  Medical History[1]    Surgical History  Surgical History[2]    Social History  Social Drivers of Health     Tobacco Use: Low Risk  (3/26/2025)    Patient History     Smoking Tobacco Use: Never     Smokeless Tobacco Use: Never     Passive Exposure: Not on file   Alcohol Use: Not At Risk (8/4/2020)    Received from [facility]    AUDIT-C     Frequency of Alcohol Consumption: Never     Average Number of Drinks: Not on file     Frequency of Binge Drinking: Never   Financial Resource Strain: Low Risk  (8/4/2020)    Received from [facility]    Overall Financial Resource Strain (CARDIA)     Difficulty of Paying Living Expenses: Not very hard   Food Insecurity: No Food Insecurity (8/4/2020)    Received from [facility]    Hunger Vital Sign     Worried About Running Out of Food in the Last Year: Never true     Ran Out of Food in the Last Year: Never true   Transportation Needs: No Transportation Needs (8/4/2020)    Received from [facility]    PRAPARE - Transportation     Lack of Transportation (Medical): No     Lack of Transportation (Non-Medical): No   Physical Activity: Insufficiently Active (8/4/2020)    Received from [facility]    Exercise Vital Sign     Days of Exercise per Week: 2 days     Minutes of Exercise per Session: 20 min   Stress: Stress Concern Present (8/4/2020)    Received from [facility]    [employer] [place] [employer] - Occupational Stress Questionnaire     Feeling of Stress : To some extent   Social Connections: Moderately Isolated (8/4/2020)    Received from [facility]    Social Connection and Isolation Panel [NHANES]     Frequency of Communication with  Friends and Family: More than three times a week     Frequency of Social Gatherings with Friends and Family: Once a week     Attends Nondenominational Services: Never     Active Member of Clubs or Organizations: No     Attends Club or Organization Meetings: Never     Marital Status:    Intimate Partner Violence: Not on file   Depression: Not at risk (3/26/2025)    PHQ-2     PHQ-2 Score: 0   Housing Stability: Low Risk  (8/4/2020)    Received from [facility]    Housing Stability Vital Sign     Unable to Pay for Housing in the Last Year: No     Number of Places Lived in the Last Year: 1     Unstable Housing in the Last Year: No   Utilities: Not on file   Digital Equity: Not on file   Health Literacy: Not on file       Family History  Family History[3]     Allergies  RX Allergies[4]    Home Medications  Current Medications[5]       Last Recorded Vitals  There were no vitals taken for this visit.    Physical Exam  General: well-nourished, no acute distress  HEENT: EOM intact, no scleral icterus  Respiratory: CTA bilaterally, normal work of breathing, on RA   Cardiovascular: RRR, no murmurs/rubs/gallops  Abdomen: Soft, nontender, nondistended, bowel sounds present, no masses palpated, no organomeagly  Extremities: no edema       Relevant Results  Current Medications[6]     Lab Results   Component Value Date    WBC 4.3 02/13/2025    HGB 13.8 02/13/2025    HCT 41.3 02/13/2025    MCV 99.3 02/13/2025     02/13/2025     Lab Results   Component Value Date    GLUCOSE 82 02/13/2025    CALCIUM 9.6 02/13/2025     02/13/2025    K 4.1 02/13/2025    CO2 31 02/13/2025    CL 99 02/13/2025    BUN 10 02/13/2025    CREATININE 0.66 02/13/2025     Lab Results   Component Value Date    ALT 13 02/13/2025    AST 22 02/13/2025    ALKPHOS 81 02/13/2025    BILITOT 0.5 02/13/2025       ASSESSMENT/PLAN:  Cori Adorno is a 61 y.o. female PMHx including duodenal diverticulum for which she was hospitalized in 11/2021 at [facility] for  duodenal diverticulitis presenting for follow up for bloating, nausea and belching concerning for dyspepsia. Today patient states symptoms have greatly improved with making dietary changes on eating less fatty foods and consistently taking hyoscyamine daily. No red flag symptoms. Will continue current regimen and follow up in six months.     #Bloating  #Nausea  -Symptoms have greatly improved on current regimen with cutting out fatty foods and limiting portion sizes   -Continue low FODMAP diet as tolerated   -Continue hyoscyamine 0.375 BID, patient taking it daily    -Continue Zofran PRN      #CRC Surveillance  -Colonoscopy 6/2023 was normal, will plan to repeat in 5 years per patient's request, due in 2028.       Follow-up in 6 months.    Plan discussed with attending physician, Dr. Rojo.     Stephanie Jade MD          [1]   Past Medical History:  Diagnosis Date    Abnormal findings on diagnostic imaging of other specified body structures 06/07/2022    Thickened endometrium    Pelvic and perineal pain 06/07/2022    Pelvic pain in female   [2]   Past Surgical History:  Procedure Laterality Date    OTHER SURGICAL HISTORY  04/12/2022    Breast reconstruction    OTHER SURGICAL HISTORY  04/12/2022    Mastectomy bilateral    OTHER SURGICAL HISTORY  06/22/2022    Robotic-assisted total laparoscopic hysterectomy   [3] No family history on file.  [4]   Allergies  Allergen Reactions    Codeine Unknown    Diphenhydramine Unknown     heart racing   [5]   Current Outpatient Medications:     aspirin 81 mg EC tablet, Take 1 tablet (81 mg) by mouth once daily., Disp: , Rfl:     atorvastatin (Lipitor) 20 mg tablet, Take 1 tablet (20 mg) by mouth once daily., Disp: 30 tablet, Rfl: 11    cholecalciferol (Vitamin D-3) 10 MCG (400 UNIT) tablet, Take 1 tablet (10 mcg) by mouth once daily., Disp: , Rfl:     hyoscyamine ER (Levbid) 0.375 mg 12 hr tablet, Take 1 tablet (0.375 mg) by mouth every 12 hours. Do not crush or chew., Disp:  180 tablet, Rfl: 3    multivitamin (Multiple Vitamins) tablet, Take 1 tablet by mouth once daily., Disp: , Rfl:     ondansetron (Zofran) 8 mg tablet, Take 1 tablet (8 mg) by mouth every 8 hours if needed., Disp: , Rfl:   [6]   Current Outpatient Medications:     aspirin 81 mg EC tablet, Take 1 tablet (81 mg) by mouth once daily., Disp: , Rfl:     atorvastatin (Lipitor) 20 mg tablet, Take 1 tablet (20 mg) by mouth once daily., Disp: 30 tablet, Rfl: 11    cholecalciferol (Vitamin D-3) 10 MCG (400 UNIT) tablet, Take 1 tablet (10 mcg) by mouth once daily., Disp: , Rfl:     hyoscyamine ER (Levbid) 0.375 mg 12 hr tablet, Take 1 tablet (0.375 mg) by mouth every 12 hours. Do not crush or chew., Disp: 180 tablet, Rfl: 3    multivitamin (Multiple Vitamins) tablet, Take 1 tablet by mouth once daily., Disp: , Rfl:     ondansetron (Zofran) 8 mg tablet, Take 1 tablet (8 mg) by mouth every 8 hours if needed., Disp: , Rfl:      oral

## 2025-05-13 ENCOUNTER — EMERGENCY (EMERGENCY)
Facility: HOSPITAL | Age: 40
LOS: 0 days | Discharge: HOME | End: 2025-05-13
Attending: EMERGENCY MEDICINE
Payer: COMMERCIAL

## 2025-05-13 VITALS
HEIGHT: 64 IN | DIASTOLIC BLOOD PRESSURE: 88 MMHG | TEMPERATURE: 98 F | OXYGEN SATURATION: 99 % | RESPIRATION RATE: 16 BRPM | WEIGHT: 154.98 LBS | SYSTOLIC BLOOD PRESSURE: 125 MMHG | HEART RATE: 82 BPM

## 2025-05-13 DIAGNOSIS — R11.2 NAUSEA WITH VOMITING, UNSPECIFIED: ICD-10-CM

## 2025-05-13 DIAGNOSIS — R19.7 DIARRHEA, UNSPECIFIED: ICD-10-CM

## 2025-05-13 DIAGNOSIS — R10.13 EPIGASTRIC PAIN: ICD-10-CM

## 2025-05-13 DIAGNOSIS — Z90.49 ACQUIRED ABSENCE OF OTHER SPECIFIED PARTS OF DIGESTIVE TRACT: ICD-10-CM

## 2025-05-13 DIAGNOSIS — Z98.891 HISTORY OF UTERINE SCAR FROM PREVIOUS SURGERY: Chronic | ICD-10-CM

## 2025-05-13 DIAGNOSIS — R74.01 ELEVATION OF LEVELS OF LIVER TRANSAMINASE LEVELS: ICD-10-CM

## 2025-05-13 LAB
ALBUMIN SERPL ELPH-MCNC: 4.3 G/DL — SIGNIFICANT CHANGE UP (ref 3.5–5.2)
ALP SERPL-CCNC: 87 U/L — SIGNIFICANT CHANGE UP (ref 30–115)
ALT FLD-CCNC: 65 U/L — HIGH (ref 0–41)
ANION GAP SERPL CALC-SCNC: 11 MMOL/L — SIGNIFICANT CHANGE UP (ref 7–14)
APPEARANCE UR: CLEAR — SIGNIFICANT CHANGE UP
AST SERPL-CCNC: 148 U/L — HIGH (ref 0–41)
BASOPHILS # BLD AUTO: 0.04 K/UL — SIGNIFICANT CHANGE UP (ref 0–0.2)
BASOPHILS NFR BLD AUTO: 0.4 % — SIGNIFICANT CHANGE UP (ref 0–1)
BILIRUB SERPL-MCNC: 0.7 MG/DL — SIGNIFICANT CHANGE UP (ref 0.2–1.2)
BILIRUB UR-MCNC: NEGATIVE — SIGNIFICANT CHANGE UP
BUN SERPL-MCNC: 7 MG/DL — LOW (ref 10–20)
CALCIUM SERPL-MCNC: 9.4 MG/DL — SIGNIFICANT CHANGE UP (ref 8.4–10.5)
CHLORIDE SERPL-SCNC: 106 MMOL/L — SIGNIFICANT CHANGE UP (ref 98–110)
CO2 SERPL-SCNC: 23 MMOL/L — SIGNIFICANT CHANGE UP (ref 17–32)
COLOR SPEC: YELLOW — SIGNIFICANT CHANGE UP
CREAT SERPL-MCNC: 0.7 MG/DL — SIGNIFICANT CHANGE UP (ref 0.7–1.5)
DIFF PNL FLD: NEGATIVE — SIGNIFICANT CHANGE UP
EGFR: 113 ML/MIN/1.73M2 — SIGNIFICANT CHANGE UP
EGFR: 113 ML/MIN/1.73M2 — SIGNIFICANT CHANGE UP
EOSINOPHIL # BLD AUTO: 0.11 K/UL — SIGNIFICANT CHANGE UP (ref 0–0.7)
EOSINOPHIL NFR BLD AUTO: 1.2 % — SIGNIFICANT CHANGE UP (ref 0–8)
GLUCOSE SERPL-MCNC: 96 MG/DL — SIGNIFICANT CHANGE UP (ref 70–99)
GLUCOSE UR QL: NEGATIVE MG/DL — SIGNIFICANT CHANGE UP
HCG SERPL QL: NEGATIVE — SIGNIFICANT CHANGE UP
HCT VFR BLD CALC: 39.2 % — SIGNIFICANT CHANGE UP (ref 37–47)
HGB BLD-MCNC: 13.5 G/DL — SIGNIFICANT CHANGE UP (ref 12–16)
IMM GRANULOCYTES NFR BLD AUTO: 0.3 % — SIGNIFICANT CHANGE UP (ref 0.1–0.3)
KETONES UR QL: ABNORMAL MG/DL
LEUKOCYTE ESTERASE UR-ACNC: NEGATIVE — SIGNIFICANT CHANGE UP
LIDOCAIN IGE QN: 39 U/L — SIGNIFICANT CHANGE UP (ref 7–60)
LYMPHOCYTES # BLD AUTO: 2.56 K/UL — SIGNIFICANT CHANGE UP (ref 1.2–3.4)
LYMPHOCYTES # BLD AUTO: 28 % — SIGNIFICANT CHANGE UP (ref 20.5–51.1)
MAGNESIUM SERPL-MCNC: 1.9 MG/DL — SIGNIFICANT CHANGE UP (ref 1.8–2.4)
MCHC RBC-ENTMCNC: 28.7 PG — SIGNIFICANT CHANGE UP (ref 27–31)
MCHC RBC-ENTMCNC: 34.4 G/DL — SIGNIFICANT CHANGE UP (ref 32–37)
MCV RBC AUTO: 83.2 FL — SIGNIFICANT CHANGE UP (ref 81–99)
MONOCYTES # BLD AUTO: 0.53 K/UL — SIGNIFICANT CHANGE UP (ref 0.1–0.6)
MONOCYTES NFR BLD AUTO: 5.8 % — SIGNIFICANT CHANGE UP (ref 1.7–9.3)
NEUTROPHILS # BLD AUTO: 5.87 K/UL — SIGNIFICANT CHANGE UP (ref 1.4–6.5)
NEUTROPHILS NFR BLD AUTO: 64.3 % — SIGNIFICANT CHANGE UP (ref 42.2–75.2)
NITRITE UR-MCNC: NEGATIVE — SIGNIFICANT CHANGE UP
NRBC BLD AUTO-RTO: 0 /100 WBCS — SIGNIFICANT CHANGE UP (ref 0–0)
PH UR: 5.5 — SIGNIFICANT CHANGE UP (ref 5–8)
PLATELET # BLD AUTO: 280 K/UL — SIGNIFICANT CHANGE UP (ref 130–400)
PMV BLD: 10.3 FL — SIGNIFICANT CHANGE UP (ref 7.4–10.4)
POTASSIUM SERPL-MCNC: 3.7 MMOL/L — SIGNIFICANT CHANGE UP (ref 3.5–5)
POTASSIUM SERPL-SCNC: 3.7 MMOL/L — SIGNIFICANT CHANGE UP (ref 3.5–5)
PROT SERPL-MCNC: 7.4 G/DL — SIGNIFICANT CHANGE UP (ref 6–8)
PROT UR-MCNC: NEGATIVE MG/DL — SIGNIFICANT CHANGE UP
RBC # BLD: 4.71 M/UL — SIGNIFICANT CHANGE UP (ref 4.2–5.4)
RBC # FLD: 12.2 % — SIGNIFICANT CHANGE UP (ref 11.5–14.5)
SODIUM SERPL-SCNC: 140 MMOL/L — SIGNIFICANT CHANGE UP (ref 135–146)
SP GR SPEC: 1.02 — SIGNIFICANT CHANGE UP (ref 1–1.03)
UROBILINOGEN FLD QL: 0.2 MG/DL — SIGNIFICANT CHANGE UP (ref 0.2–1)
WBC # BLD: 9.14 K/UL — SIGNIFICANT CHANGE UP (ref 4.8–10.8)
WBC # FLD AUTO: 9.14 K/UL — SIGNIFICANT CHANGE UP (ref 4.8–10.8)

## 2025-05-13 PROCEDURE — 87086 URINE CULTURE/COLONY COUNT: CPT

## 2025-05-13 PROCEDURE — 96375 TX/PRO/DX INJ NEW DRUG ADDON: CPT

## 2025-05-13 PROCEDURE — 83735 ASSAY OF MAGNESIUM: CPT

## 2025-05-13 PROCEDURE — 87077 CULTURE AEROBIC IDENTIFY: CPT

## 2025-05-13 PROCEDURE — 83690 ASSAY OF LIPASE: CPT

## 2025-05-13 PROCEDURE — 87186 SC STD MICRODIL/AGAR DIL: CPT

## 2025-05-13 PROCEDURE — 99284 EMERGENCY DEPT VISIT MOD MDM: CPT | Mod: 25

## 2025-05-13 PROCEDURE — 80053 COMPREHEN METABOLIC PANEL: CPT

## 2025-05-13 PROCEDURE — 84703 CHORIONIC GONADOTROPIN ASSAY: CPT

## 2025-05-13 PROCEDURE — 74177 CT ABD & PELVIS W/CONTRAST: CPT | Mod: 26

## 2025-05-13 PROCEDURE — 81003 URINALYSIS AUTO W/O SCOPE: CPT

## 2025-05-13 PROCEDURE — 99285 EMERGENCY DEPT VISIT HI MDM: CPT

## 2025-05-13 PROCEDURE — 74177 CT ABD & PELVIS W/CONTRAST: CPT

## 2025-05-13 PROCEDURE — 85025 COMPLETE CBC W/AUTO DIFF WBC: CPT

## 2025-05-13 PROCEDURE — 96376 TX/PRO/DX INJ SAME DRUG ADON: CPT

## 2025-05-13 PROCEDURE — 96374 THER/PROPH/DIAG INJ IV PUSH: CPT | Mod: XU

## 2025-05-13 PROCEDURE — 36415 COLL VENOUS BLD VENIPUNCTURE: CPT

## 2025-05-13 RX ORDER — ONDANSETRON HCL/PF 4 MG/2 ML
1 VIAL (ML) INJECTION
Qty: 9 | Refills: 0
Start: 2025-05-13 | End: 2025-05-15

## 2025-05-13 RX ORDER — KETOROLAC TROMETHAMINE 30 MG/ML
15 INJECTION, SOLUTION INTRAMUSCULAR; INTRAVENOUS ONCE
Refills: 0 | Status: DISCONTINUED | OUTPATIENT
Start: 2025-05-13 | End: 2025-05-13

## 2025-05-13 RX ORDER — SODIUM CHLORIDE 9 G/1000ML
1000 INJECTION, SOLUTION INTRAVENOUS ONCE
Refills: 0 | Status: COMPLETED | OUTPATIENT
Start: 2025-05-13 | End: 2025-05-13

## 2025-05-13 RX ORDER — DIPHENHYDRAMINE HYDROCHLORIDE AND LIDOCAINE HYDROCHLORIDE AND ALUMINUM HYDROXIDE AND MAGNESIUM HYDRO
30 KIT ONCE
Refills: 0 | Status: COMPLETED | OUTPATIENT
Start: 2025-05-13 | End: 2025-05-13

## 2025-05-13 RX ORDER — ONDANSETRON HCL/PF 4 MG/2 ML
4 VIAL (ML) INJECTION ONCE
Refills: 0 | Status: COMPLETED | OUTPATIENT
Start: 2025-05-13 | End: 2025-05-13

## 2025-05-13 RX ADMIN — SODIUM CHLORIDE 1000 MILLILITER(S): 9 INJECTION, SOLUTION INTRAVENOUS at 15:48

## 2025-05-13 RX ADMIN — Medication 4 MILLIGRAM(S): at 12:37

## 2025-05-13 RX ADMIN — KETOROLAC TROMETHAMINE 15 MILLIGRAM(S): 30 INJECTION, SOLUTION INTRAMUSCULAR; INTRAVENOUS at 15:13

## 2025-05-13 RX ADMIN — SODIUM CHLORIDE 1000 MILLILITER(S): 9 INJECTION, SOLUTION INTRAVENOUS at 12:37

## 2025-05-13 RX ADMIN — Medication 20 MILLIGRAM(S): at 12:37

## 2025-05-13 RX ADMIN — Medication 4 MILLIGRAM(S): at 15:48

## 2025-05-13 RX ADMIN — KETOROLAC TROMETHAMINE 15 MILLIGRAM(S): 30 INJECTION, SOLUTION INTRAMUSCULAR; INTRAVENOUS at 12:37

## 2025-05-13 NOTE — ED PROVIDER NOTE - PATIENT PORTAL LINK FT
You can access the FollowMyHealth Patient Portal offered by [facility] by registering at the following website: [email or web address]. By joining [employer]’s FollowMyHealth portal, you will also be able to view your health information using other applications (apps) compatible with our system.

## 2025-05-13 NOTE — ED PROVIDER NOTE - ATTENDING APP SHARED VISIT CONTRIBUTION OF CARE
39-year-old female with no sig PMHx, in ER for evaluation of N/V/D.  Patient states she started Ozempic for weight loss 7 days ago, symptoms started the following day.  Had been on Zepbound for 3 months previously which she tolerated with some mild nausea and diarrhea.  Switch to Ozempic due to cost (prescribed by telehealth weight loss doctor), thought initially that symptoms would improve, but they have persisted over the past week. +N, ~3-4 episodes of V and D per day.  Has been able to tolerate some p.o.  + Mid to upper abdominal pain/cramping.  No blood in stool or vomitus.  No urinary vaginal symptoms.  LMP 2 weeks ago.  No back pain.  No CP/SOB.  No HA/dizziness/syncope.  No F/C.  s/p cholecystectomy and  x 2.  PE - nad, nc/at, eomi, perrl, op - clear, mmm, neck supple, cta b/l, no w/r/r, rrr, abd- soft, nd, + mild mid/upper abdominal tenderness, no guarding/rebound, nabs, from x 4, no LE swelling/tenderness, A&O x 3, cn 2-12 intact, no focal motor/sensory deficits.   - IVF, antiemetics, check labs/CT abdomen, re-eval

## 2025-05-13 NOTE — ED PROVIDER NOTE - PROVIDER TOKENS
FREE:[LAST:[your primary doctor],PHONE:[(   )    -],FAX:[(   )    -],FOLLOWUP:[1-3 Days]],PROVIDER:[TOKEN:[[id number]],FOLLOWUP:[1-3 Days]]

## 2025-05-13 NOTE — ED PROVIDER NOTE - CLINICAL SUMMARY MEDICAL DECISION MAKING FREE TEXT BOX
39-year-old female with PMHx as noted, recently started on Ozempic 1 week ago, in ER valuation of abdominal pain, N/V/D.  Labs reviewed: CBC unremarkable, CMP with mildly elevated LFTs (, , T. bili 0.9), lipase 39, serum pregnancy negative.  UA negative.  CT abdomen: Trace free fluid in RLQ, nonspecific; normal appendix; otherwise unremarkable exam.  Results of labs and imaging discussed with patient.  Patient with no lower abdominal tenderness.  Feels better after IV fluid and meds in ER.  No further vomiting, + tolerating p.o.  Patient comfortable going home, she will not take any additional doses of Ozempic until discussion with her doctor.  To follow-up with PMD and GI.  Patient aware to have LFTs repeated.  Told to return to ER for worsening pain, continued vomiting, or any other new/concerning symptom.  Patient understands and agrees with plan.

## 2025-05-13 NOTE — ED PROVIDER NOTE - OBJECTIVE STATEMENT
Patient is a 39-year-old female PMH s/p cholecystectomy coming to ED for diarrhea and vomiting.  Patient reports was on Tirzepatide in the past but was just switched to Ozempic, has taken 1 dose, since then has had approximately 8 episodes of nonbloody diarrhea daily and 3-4 episodes of NBNB vomiting daily.  Patient complaining of epigastric abdominal pain.  Has taken Tylenol and Motrin with minimal relief.  Denies fever, headache, chest pain, shortness of breath, urinary symptoms, constipation, recent travel, recent antibiotics

## 2025-05-13 NOTE — ED ADULT NURSE NOTE - NSFALLUNIVINTERV_ED_ALL_ED
Bed/Stretcher in lowest position, wheels locked, appropriate side rails in place/Call bell, personal items and telephone in reach/Instruct patient to call for assistance before getting out of bed/chair/stretcher/Non-slip footwear applied when patient is off stretcher/[place] to call system/Physically safe environment - no spills, clutter or unnecessary equipment/Purposeful proactive rounding/Room/bathroom lighting operational, light cord in reach

## 2025-05-13 NOTE — ED PROVIDER NOTE - NSFOLLOWUPINSTRUCTIONS_ED_ALL_ED_FT
Our Emergency Department Referral Coordinators will be reaching out to you in the next 24-48 hours from 9:00am to 5:00pm to schedule a follow up appointment. Please expect a phone call from the hospital in that time frame. If you do not receive a call or if you have any questions or concerns, you can reach them at   [phone].    FOLLOW UP WITH GASTROENTEROLOGY  FOLLOW UP WITH YOUR PRIMARY DOCTOR FOR REPEAT BLOOD WORK  RETURN TO ED FOR NEW OR WORSENING SYMPTOMS    Nausea / Vomiting    Nausea is the feeling that you have to vomit. As nausea gets worse, it can lead to vomiting. Vomiting puts you at an increased risk for dehydration. Older adults and people with other diseases or a weak immune system are at higher risk for dehydration. Drink clear fluids in small but frequent amounts as tolerated. Eat bland, easy-to-digest foods in small amounts as tolerated.    SEEK IMMEDIATE MEDICAL CARE IF YOU HAVE ANY OF THE FOLLOWING SYMPTOMS: fever, inability to keep sufficient fluids down, black or bloody vomitus, black or bloody stools, lightheadedness/dizziness, chest pain, severe headache, rash, shortness of breath, cold or clammy skin, confusion, pain with urination, or any signs of dehydration.

## 2025-05-13 NOTE — ED PROVIDER NOTE - PHYSICAL EXAMINATION
CONST: Well appearing in NAD  EYES: EOMI, Sclera and conjunctiva clear.   ENT: No nasal discharge. Oropharynx normal appearing, no erythema or exudates. Uvula midline.  NECK: Non-tender,   CARD: Normal S1 S2; Normal rate and rhythm  RESP: Equal BS B/L, No wheezes, rhonchi or rales. No distress  GI: Soft, non-distended, TTP epigastric area  MS: Normal ROM in all extremities. No midline spinal tenderness.  SKIN: Warm, dry, no acute rashes. Good turgor  NEURO: A&Ox3, No focal deficits. Strength 5/5 with no sensory deficits. Steady gait

## 2025-05-13 NOTE — ED ADULT NURSE NOTE - HIV OFFER
Anh Su (:  1932) is a 81 y.o. female,Established patient, here for evaluation of the following chief complaint(s):  Urinary Tract Infection         ASSESSMENT/PLAN:  1. UTI symptoms  -     POCT Urinalysis no Micro  -     Culture, Urine  2. Acute cystitis without hematuria  -     ciprofloxacin (CIPRO) 500 MG tablet; Take 1 tablet by mouth 2 times daily for 7 days, Disp-14 tablet, R-0Normal      Return if symptoms worsen or fail to improve, for with PCP. May need urology referral if recurring UTI continues to be problem. Subjective   SUBJECTIVE/OBJECTIVE:  Urinary Tract Infection  This is a new problem. The current episode started 1 to 4 weeks ago. Associated symptoms include pain. Pertinent negatives include no hematuria. The pain is present in the abdomen and bladder. Review of Systems   Constitutional:  Negative for chills, diaphoresis and fever. Respiratory: Negative. Cardiovascular: Negative. Gastrointestinal:  Positive for abdominal pain. Genitourinary:  Positive for dysuria and urgency. Negative for hematuria. Objective   Physical Exam  Constitutional:       General: She is not in acute distress. Appearance: Normal appearance. She is not ill-appearing. HENT:      Head: Normocephalic and atraumatic. Cardiovascular:      Rate and Rhythm: Normal rate and regular rhythm. Heart sounds: Normal heart sounds. No murmur heard. Pulmonary:      Effort: No respiratory distress. Breath sounds: Normal breath sounds. Abdominal:      General: Bowel sounds are normal. There is no distension. Palpations: Abdomen is soft. Tenderness: There is abdominal tenderness. There is no guarding. Skin:     General: Skin is warm and dry. Capillary Refill: Capillary refill takes less than 2 seconds. Neurological:      Mental Status: She is alert and oriented to person, place, and time. Psychiatric:         Thought Content:  Thought content normal. Previously Declined (within the last year)

## 2025-05-13 NOTE — ED PROVIDER NOTE - CARE PROVIDER_API CALL
your primary doctor,   Phone: (   )    -  Fax: (   )    -  Follow Up Time: 1-3 Days    Jesu Ríos  Gastroenterology  [address] [place] [id number]  Phone: [phone]  Fax: [phone]  Follow Up Time: 1-3 Days

## 2025-05-13 NOTE — ED ADULT TRIAGE NOTE - CHIEF COMPLAINT QUOTE
Pt here reports took ozempic and having diarrhea and vomitng x 1 week. Pt here reports took Ozempic and having diarrhea and vomiting x 1 week.

## 2025-05-14 NOTE — CHART NOTE - NSCHARTNOTEFT_GEN_A_CORE
"[id number] / Appointment made - JL / [id number] / Dr NIA CHILDS,WEI / 	gretchen 07/31/2025 03:45 PM"    SPECIALTY: gastroenterology

## 2025-05-17 LAB
-  AMPICILLIN: SIGNIFICANT CHANGE UP
-  CIPROFLOXACIN: SIGNIFICANT CHANGE UP
-  LEVOFLOXACIN: SIGNIFICANT CHANGE UP
-  NITROFURANTOIN: SIGNIFICANT CHANGE UP
-  TETRACYCLINE: SIGNIFICANT CHANGE UP
-  VANCOMYCIN: SIGNIFICANT CHANGE UP
CULTURE RESULTS: ABNORMAL
METHOD TYPE: SIGNIFICANT CHANGE UP
ORGANISM # SPEC MICROSCOPIC CNT: ABNORMAL
ORGANISM # SPEC MICROSCOPIC CNT: SIGNIFICANT CHANGE UP
SPECIMEN SOURCE: SIGNIFICANT CHANGE UP

## 2025-05-18 ENCOUNTER — EMERGENCY (EMERGENCY)
Facility: HOSPITAL | Age: 40
LOS: 0 days | Discharge: HOME | End: 2025-05-18
Attending: EMERGENCY MEDICINE
Payer: COMMERCIAL

## 2025-05-18 VITALS
OXYGEN SATURATION: 99 % | RESPIRATION RATE: 18 BRPM | TEMPERATURE: 98 F | HEART RATE: 76 BPM | DIASTOLIC BLOOD PRESSURE: 87 MMHG | HEIGHT: 64 IN | SYSTOLIC BLOOD PRESSURE: 122 MMHG

## 2025-05-18 DIAGNOSIS — Z98.891 HISTORY OF UTERINE SCAR FROM PREVIOUS SURGERY: Chronic | ICD-10-CM

## 2025-05-18 DIAGNOSIS — R11.2 NAUSEA WITH VOMITING, UNSPECIFIED: ICD-10-CM

## 2025-05-18 DIAGNOSIS — R10.32 LEFT LOWER QUADRANT PAIN: ICD-10-CM

## 2025-05-18 LAB
ALBUMIN SERPL ELPH-MCNC: 4.3 G/DL — SIGNIFICANT CHANGE UP (ref 3.5–5.2)
ALP SERPL-CCNC: 210 U/L — HIGH (ref 30–115)
ALT FLD-CCNC: 233 U/L — HIGH (ref 0–41)
ANION GAP SERPL CALC-SCNC: 12 MMOL/L — SIGNIFICANT CHANGE UP (ref 7–14)
AST SERPL-CCNC: 153 U/L — HIGH (ref 0–41)
BASE EXCESS BLDV CALC-SCNC: 1.5 MMOL/L — SIGNIFICANT CHANGE UP (ref -2–3)
BASOPHILS # BLD AUTO: 0.05 K/UL — SIGNIFICANT CHANGE UP (ref 0–0.2)
BASOPHILS NFR BLD AUTO: 0.5 % — SIGNIFICANT CHANGE UP (ref 0–1)
BILIRUB DIRECT SERPL-MCNC: 0.3 MG/DL — SIGNIFICANT CHANGE UP (ref 0–0.3)
BILIRUB INDIRECT FLD-MCNC: 0.4 MG/DL — SIGNIFICANT CHANGE UP (ref 0.2–1.2)
BILIRUB SERPL-MCNC: 0.7 MG/DL — SIGNIFICANT CHANGE UP (ref 0.2–1.2)
BUN SERPL-MCNC: 8 MG/DL — LOW (ref 10–20)
CA-I SERPL-SCNC: 1.17 MMOL/L — SIGNIFICANT CHANGE UP (ref 1.15–1.33)
CALCIUM SERPL-MCNC: 9.3 MG/DL — SIGNIFICANT CHANGE UP (ref 8.4–10.5)
CHLORIDE SERPL-SCNC: 99 MMOL/L — SIGNIFICANT CHANGE UP (ref 98–110)
CO2 SERPL-SCNC: 25 MMOL/L — SIGNIFICANT CHANGE UP (ref 17–32)
CREAT SERPL-MCNC: 0.7 MG/DL — SIGNIFICANT CHANGE UP (ref 0.7–1.5)
EGFR: 113 ML/MIN/1.73M2 — SIGNIFICANT CHANGE UP
EGFR: 113 ML/MIN/1.73M2 — SIGNIFICANT CHANGE UP
EOSINOPHIL # BLD AUTO: 0.26 K/UL — SIGNIFICANT CHANGE UP (ref 0–0.7)
EOSINOPHIL NFR BLD AUTO: 2.8 % — SIGNIFICANT CHANGE UP (ref 0–8)
GAS PNL BLDV: 130 MMOL/L — LOW (ref 136–145)
GAS PNL BLDV: SIGNIFICANT CHANGE UP
GAS PNL BLDV: SIGNIFICANT CHANGE UP
GLUCOSE SERPL-MCNC: 84 MG/DL — SIGNIFICANT CHANGE UP (ref 70–99)
HCG SERPL QL: NEGATIVE — SIGNIFICANT CHANGE UP
HCO3 BLDV-SCNC: 29 MMOL/L — SIGNIFICANT CHANGE UP (ref 22–29)
HCT VFR BLD CALC: 40.6 % — SIGNIFICANT CHANGE UP (ref 37–47)
HCT VFR BLDA CALC: 58 % — CRITICAL HIGH (ref 34.5–46.5)
HGB BLD CALC-MCNC: 19.4 G/DL — CRITICAL HIGH (ref 11.7–16.1)
HGB BLD-MCNC: 13.7 G/DL — SIGNIFICANT CHANGE UP (ref 12–16)
IMM GRANULOCYTES NFR BLD AUTO: 0.3 % — SIGNIFICANT CHANGE UP (ref 0.1–0.3)
LACTATE BLDV-MCNC: 1.2 MMOL/L — SIGNIFICANT CHANGE UP (ref 0.5–2)
LIDOCAIN IGE QN: 26 U/L — SIGNIFICANT CHANGE UP (ref 7–60)
LYMPHOCYTES # BLD AUTO: 2.6 K/UL — SIGNIFICANT CHANGE UP (ref 1.2–3.4)
LYMPHOCYTES # BLD AUTO: 28 % — SIGNIFICANT CHANGE UP (ref 20.5–51.1)
MCHC RBC-ENTMCNC: 28.3 PG — SIGNIFICANT CHANGE UP (ref 27–31)
MCHC RBC-ENTMCNC: 33.7 G/DL — SIGNIFICANT CHANGE UP (ref 32–37)
MCV RBC AUTO: 83.9 FL — SIGNIFICANT CHANGE UP (ref 81–99)
MONOCYTES # BLD AUTO: 0.43 K/UL — SIGNIFICANT CHANGE UP (ref 0.1–0.6)
MONOCYTES NFR BLD AUTO: 4.6 % — SIGNIFICANT CHANGE UP (ref 1.7–9.3)
NEUTROPHILS # BLD AUTO: 5.93 K/UL — SIGNIFICANT CHANGE UP (ref 1.4–6.5)
NEUTROPHILS NFR BLD AUTO: 63.8 % — SIGNIFICANT CHANGE UP (ref 42.2–75.2)
NRBC BLD AUTO-RTO: 0 /100 WBCS — SIGNIFICANT CHANGE UP (ref 0–0)
PCO2 BLDV: 52 MMHG — HIGH (ref 39–42)
PH BLDV: 7.35 — SIGNIFICANT CHANGE UP (ref 7.32–7.43)
PLATELET # BLD AUTO: 284 K/UL — SIGNIFICANT CHANGE UP (ref 130–400)
PMV BLD: 10.4 FL — SIGNIFICANT CHANGE UP (ref 7.4–10.4)
PO2 BLDV: 27 MMHG — SIGNIFICANT CHANGE UP (ref 25–45)
POTASSIUM BLDV-SCNC: 3.8 MMOL/L — SIGNIFICANT CHANGE UP (ref 3.5–5.1)
POTASSIUM SERPL-MCNC: 4.2 MMOL/L — SIGNIFICANT CHANGE UP (ref 3.5–5)
POTASSIUM SERPL-SCNC: 4.2 MMOL/L — SIGNIFICANT CHANGE UP (ref 3.5–5)
PROT SERPL-MCNC: 8.3 G/DL — HIGH (ref 6–8)
RBC # BLD: 4.84 M/UL — SIGNIFICANT CHANGE UP (ref 4.2–5.4)
RBC # FLD: 12.4 % — SIGNIFICANT CHANGE UP (ref 11.5–14.5)
SAO2 % BLDV: 43 % — LOW (ref 67–88)
SODIUM SERPL-SCNC: 136 MMOL/L — SIGNIFICANT CHANGE UP (ref 135–146)
WBC # BLD: 9.3 K/UL — SIGNIFICANT CHANGE UP (ref 4.8–10.8)
WBC # FLD AUTO: 9.3 K/UL — SIGNIFICANT CHANGE UP (ref 4.8–10.8)

## 2025-05-18 PROCEDURE — 80048 BASIC METABOLIC PNL TOTAL CA: CPT

## 2025-05-18 PROCEDURE — 76705 ECHO EXAM OF ABDOMEN: CPT

## 2025-05-18 PROCEDURE — 82330 ASSAY OF CALCIUM: CPT

## 2025-05-18 PROCEDURE — 76705 ECHO EXAM OF ABDOMEN: CPT | Mod: 26

## 2025-05-18 PROCEDURE — 99285 EMERGENCY DEPT VISIT HI MDM: CPT

## 2025-05-18 PROCEDURE — 85014 HEMATOCRIT: CPT

## 2025-05-18 PROCEDURE — 74177 CT ABD & PELVIS W/CONTRAST: CPT

## 2025-05-18 PROCEDURE — 84703 CHORIONIC GONADOTROPIN ASSAY: CPT

## 2025-05-18 PROCEDURE — 83690 ASSAY OF LIPASE: CPT

## 2025-05-18 PROCEDURE — 80076 HEPATIC FUNCTION PANEL: CPT

## 2025-05-18 PROCEDURE — 96374 THER/PROPH/DIAG INJ IV PUSH: CPT | Mod: XU

## 2025-05-18 PROCEDURE — 74177 CT ABD & PELVIS W/CONTRAST: CPT | Mod: 26

## 2025-05-18 PROCEDURE — 84132 ASSAY OF SERUM POTASSIUM: CPT

## 2025-05-18 PROCEDURE — 36415 COLL VENOUS BLD VENIPUNCTURE: CPT

## 2025-05-18 PROCEDURE — 82803 BLOOD GASES ANY COMBINATION: CPT

## 2025-05-18 PROCEDURE — 99284 EMERGENCY DEPT VISIT MOD MDM: CPT | Mod: 25

## 2025-05-18 PROCEDURE — 83605 ASSAY OF LACTIC ACID: CPT

## 2025-05-18 PROCEDURE — 85018 HEMOGLOBIN: CPT

## 2025-05-18 PROCEDURE — 84295 ASSAY OF SERUM SODIUM: CPT

## 2025-05-18 PROCEDURE — 85025 COMPLETE CBC W/AUTO DIFF WBC: CPT

## 2025-05-18 RX ORDER — CEFPODOXIME PROXETIL 200 MG/1
1 TABLET, FILM COATED ORAL
Qty: 10 | Refills: 0
Start: 2025-05-18 | End: 2025-05-22

## 2025-05-18 RX ORDER — ONDANSETRON HCL/PF 4 MG/2 ML
4 VIAL (ML) INJECTION ONCE
Refills: 0 | Status: COMPLETED | OUTPATIENT
Start: 2025-05-18 | End: 2025-05-18

## 2025-05-18 RX ORDER — METOCLOPRAMIDE HCL 10 MG
1 TABLET ORAL
Qty: 9 | Refills: 0
Start: 2025-05-18 | End: 2025-05-20

## 2025-05-18 RX ADMIN — Medication 1000 MILLILITER(S): at 13:40

## 2025-05-18 RX ADMIN — Medication 4 MILLIGRAM(S): at 14:29

## 2025-05-18 NOTE — ED PROVIDER NOTE - OBJECTIVE STATEMENT
39yoF with hx of ccy, csecrtion x2, here with n/v. NBNB. x several days. recently stopped ozempic. had diarrhea initially now resolved.   -denies abd pain, no cp or sob  -no constipation, melena, brbpr  -No dysuria, hematuria  -no abnormal vaginal bleeding or d/c  -No new back pain  -no f/c

## 2025-05-18 NOTE — ED ADULT NURSE NOTE - NSFALLUNIVINTERV_ED_ALL_ED
Bed/Stretcher in lowest position, wheels locked, appropriate side rails in place/Call bell, personal items and telephone in reach/Instruct patient to call for assistance before getting out of bed/chair/stretcher/Non-slip footwear applied when patient is off stretcher/[place] to call system/Physically safe environment - no spills, clutter or unnecessary equipment/Purposeful proactive rounding/Room/bathroom lighting operational, light cord in reach

## 2025-05-18 NOTE — ED PROVIDER NOTE - CLINICAL SUMMARY MEDICAL DECISION MAKING FREE TEXT BOX
ED workup reassuring. Pt improved and feels better after ED treatment. In my opinion, based on current evaluation and results, an acute medical or surgical emergency does not appear to be occurring at this time and I feel that the pt is stable for further outpatient work up and/or treatment. Return precautions discussed. aware of all incidental findings and need to follow up with PMD and show them liver results. will rx po abx of urine cx from last visit.

## 2025-05-18 NOTE — ED PROVIDER NOTE - PATIENT PORTAL LINK FT
You can access the FollowMyHealth Patient Portal offered by  by registering at the following website: [email or web address]. By joining [employer]’s FollowMyHealth portal, you will also be able to view your health information using other applications (apps) compatible with our system.

## 2025-05-18 NOTE — ED ADULT NURSE NOTE - OBJECTIVE STATEMENT
Aox4 pt c/o abdominal tenderness and vomiting. IV placed, labs sent and fluids started per provider order

## 2025-05-18 NOTE — ED PROVIDER NOTE - NSFOLLOWUPINSTRUCTIONS_ED_ALL_ED_FT
Follow up with your primary care doctor within 1 week. Show them your results. Drink plenty of fluids (water, soup, gatorade, pedialyte) to stay hydrated. Please return to the emergency department if you have new/changed/worsening abdominal pain, abdominal swelling, nausea, inability to tolerate food or drink, vomiting, diarrhea, blood in stool, vomiting blood,  urinary incontinence/retention, urinary frequency, urinary discharge, vaginal bleeding, vaginal discharge, fevers >100.4 or other symptoms that you find concerning.

## 2025-05-18 NOTE — ED PROVIDER NOTE - PHYSICAL EXAMINATION
Gen: Alert, NAD  Skin: Warm, dry, intact  Head: NCAT  ENT: Mucous membranes moist  Neck: Supple  Resp: Non labored respirations  Abdomen: Soft, nondistended, mild LLQ ttp, no r/g  Extremities: Moving all extremities, no edema  Neuro: No focal neuro deficits  Psych: Cooperative

## 2025-05-19 RX ORDER — NITROFURANTOIN MACROCRYSTAL 100 MG
1 CAPSULE ORAL
Qty: 14 | Refills: 0
Start: 2025-05-19 | End: 2025-05-25

## 2025-07-31 ENCOUNTER — APPOINTMENT (OUTPATIENT)
Dept: GASTROENTEROLOGY | Facility: CLINIC | Age: 40
End: 2025-07-31

## 2025-07-31 DIAGNOSIS — R79.0 ABNORMAL LVL OF BLOOD MINERAL: ICD-10-CM
